# Patient Record
Sex: FEMALE | Race: WHITE | NOT HISPANIC OR LATINO | Employment: OTHER | URBAN - METROPOLITAN AREA
[De-identification: names, ages, dates, MRNs, and addresses within clinical notes are randomized per-mention and may not be internally consistent; named-entity substitution may affect disease eponyms.]

---

## 2017-09-28 ENCOUNTER — TRANSCRIBE ORDERS (OUTPATIENT)
Dept: ADMINISTRATIVE | Facility: HOSPITAL | Age: 82
End: 2017-09-28

## 2017-09-28 DIAGNOSIS — M54.5 LOW BACK PAIN, UNSPECIFIED BACK PAIN LATERALITY, UNSPECIFIED CHRONICITY, WITH SCIATICA PRESENCE UNSPECIFIED: Primary | ICD-10-CM

## 2017-10-11 ENCOUNTER — ANESTHESIA (OUTPATIENT)
Dept: PAIN MEDICINE | Facility: HOSPITAL | Age: 82
End: 2017-10-11

## 2017-10-11 ENCOUNTER — ANESTHESIA EVENT (OUTPATIENT)
Dept: PAIN MEDICINE | Facility: HOSPITAL | Age: 82
End: 2017-10-11

## 2017-10-11 ENCOUNTER — HOSPITAL ENCOUNTER (OUTPATIENT)
Dept: GENERAL RADIOLOGY | Facility: HOSPITAL | Age: 82
Discharge: HOME | End: 2017-10-11

## 2017-10-11 ENCOUNTER — HOSPITAL ENCOUNTER (OUTPATIENT)
Dept: PAIN MEDICINE | Facility: HOSPITAL | Age: 82
Discharge: HOME | End: 2017-10-11
Attending: ORTHOPAEDIC SURGERY | Admitting: ORTHOPAEDIC SURGERY

## 2017-10-11 VITALS
BODY MASS INDEX: 29.83 KG/M2 | DIASTOLIC BLOOD PRESSURE: 66 MMHG | TEMPERATURE: 98.4 F | SYSTOLIC BLOOD PRESSURE: 128 MMHG | HEART RATE: 84 BPM | WEIGHT: 158 LBS | HEIGHT: 61 IN | RESPIRATION RATE: 16 BRPM | OXYGEN SATURATION: 95 %

## 2017-10-11 DIAGNOSIS — R52 PAIN: ICD-10-CM

## 2017-10-11 DIAGNOSIS — M54.5 LOW BACK PAIN, UNSPECIFIED BACK PAIN LATERALITY, UNSPECIFIED CHRONICITY, WITH SCIATICA PRESENCE UNSPECIFIED: ICD-10-CM

## 2017-10-11 LAB — GLUCOSE BLDC GLUCOMTR-MCNC: 154 MG/DL (ref 70–130)

## 2017-10-11 PROCEDURE — 82962 GLUCOSE BLOOD TEST: CPT

## 2017-10-11 PROCEDURE — C1755 CATHETER, INTRASPINAL: HCPCS

## 2017-10-11 PROCEDURE — 77003 FLUOROGUIDE FOR SPINE INJECT: CPT

## 2017-10-11 PROCEDURE — 25010000002 METHYLPREDNISOLONE PER 80 MG: Performed by: ANESTHESIOLOGY

## 2017-10-11 PROCEDURE — 0 IOPAMIDOL 41 % SOLUTION: Performed by: ANESTHESIOLOGY

## 2017-10-11 RX ORDER — FESOTERODINE FUMARATE 4 MG/1
4 TABLET, EXTENDED RELEASE ORAL DAILY PRN
COMMUNITY

## 2017-10-11 RX ORDER — METHYLPREDNISOLONE ACETATE 80 MG/ML
80 INJECTION, SUSPENSION INTRA-ARTICULAR; INTRALESIONAL; INTRAMUSCULAR; SOFT TISSUE ONCE
Status: COMPLETED | OUTPATIENT
Start: 2017-10-11 | End: 2017-10-11

## 2017-10-11 RX ORDER — ESOMEPRAZOLE MAGNESIUM 40 MG/1
40 CAPSULE, DELAYED RELEASE ORAL
COMMUNITY
End: 2020-10-05

## 2017-10-11 RX ORDER — AMLODIPINE BESYLATE 5 MG/1
5 TABLET ORAL DAILY
COMMUNITY
End: 2020-10-05

## 2017-10-11 RX ORDER — LIDOCAINE HYDROCHLORIDE 10 MG/ML
1 INJECTION, SOLUTION INFILTRATION; PERINEURAL ONCE AS NEEDED
Status: DISCONTINUED | OUTPATIENT
Start: 2017-10-11 | End: 2017-10-12 | Stop reason: HOSPADM

## 2017-10-11 RX ORDER — MIDAZOLAM HYDROCHLORIDE 1 MG/ML
1 INJECTION INTRAMUSCULAR; INTRAVENOUS AS NEEDED
Status: DISCONTINUED | OUTPATIENT
Start: 2017-10-11 | End: 2017-10-12 | Stop reason: HOSPADM

## 2017-10-11 RX ORDER — LEVOTHYROXINE SODIUM 0.05 MG/1
50 TABLET ORAL
COMMUNITY

## 2017-10-11 RX ORDER — SODIUM CHLORIDE 0.9 % (FLUSH) 0.9 %
1-10 SYRINGE (ML) INJECTION AS NEEDED
Status: DISCONTINUED | OUTPATIENT
Start: 2017-10-11 | End: 2017-10-12 | Stop reason: HOSPADM

## 2017-10-11 RX ORDER — VALSARTAN AND HYDROCHLOROTHIAZIDE 160; 25 MG/1; MG/1
2 TABLET ORAL DAILY
COMMUNITY
End: 2020-09-21 | Stop reason: SDUPTHER

## 2017-10-11 RX ORDER — FENTANYL CITRATE 50 UG/ML
50 INJECTION, SOLUTION INTRAMUSCULAR; INTRAVENOUS AS NEEDED
Status: DISCONTINUED | OUTPATIENT
Start: 2017-10-11 | End: 2017-10-12 | Stop reason: HOSPADM

## 2017-10-11 RX ORDER — ATORVASTATIN CALCIUM 20 MG/1
20 TABLET, FILM COATED ORAL DAILY
COMMUNITY
End: 2020-11-11

## 2017-10-11 RX ADMIN — METHYLPREDNISOLONE ACETATE 80 MG: 80 INJECTION, SUSPENSION INTRA-ARTICULAR; INTRALESIONAL; INTRAMUSCULAR; SOFT TISSUE at 10:28

## 2017-10-11 RX ADMIN — IOPAMIDOL 10 ML: 408 INJECTION, SOLUTION INTRATHECAL at 10:27

## 2017-10-11 NOTE — ANESTHESIA PROCEDURE NOTES
PAIN Epidural block    Patient location during procedure: pain clinic  Start Time: 10/11/2017 10:20 AM  Stop Time: 10/11/2017 10:28 AM  Indication:procedure for pain  Performed By  Anesthesiologist: GORDY CORONA  Preanesthetic Checklist  Completed: patient identified, site marked, surgical consent, pre-op evaluation, timeout performed, IV checked, risks and benefits discussed and monitors and equipment checked  Additional Notes  Low back pain, lumbar radiculopathy  Fluoro used  Prep:  Pt Position:prone  Sterile Tech:cap, gloves, mask and sterile barrier  Prep:chlorhexidine gluconate and isopropyl alcohol  Monitoring:blood pressure monitoring, continuous pulse oximetry and EKG  Procedure:  Approach:midline  Guidance: fluoroscopy  Location:lumbar  Level:5-6  Needle Type:Tuohy  Needle Gauge:20  Aspiration:negative  Medications:  Depomedrol:80  Preservative Free Saline:3mL  Isovue:3mL  Comments:B/l spread  Post Assessment:  Dressing:occlusive dressing applied  Pt Tolerance:patient tolerated the procedure well with no apparent complications  Complications:no

## 2017-10-11 NOTE — H&P
[facility]    History and Physical    Patient Name: Ashli Eaton  :  1935  MRN:  [id number]  Date of Admission: 10/11/2017    Subjective     Patient is a 81 y.o. female presents with chief complaint of chronic, severe, waxing and waning low back and leg: right pain.  Onset of symptoms was abrupt starting 1 year ago.  Symptoms are associated/aggravated by activity or sitting. Symptoms improve with nothing.  First noticed pain 2017 while getting off a flight.  Pain has been present since then though no specific event/injury.  Pain fluctuates from a 4-8/10 w/ activity -- pain is terrible when doing stairs.  Presents for lesi 1.      The following portions of the patients history were reviewed and updated as appropriate: current medications, allergies, past medical history, past surgical history, past family history, past social history and problem list                Objective     Past Medical History:   Past Medical History:   Diagnosis Date   • Arthritis    • Diabetes mellitus    • GERD (gastroesophageal reflux disease)    • Hyperlipidemia    • Hypertension      Past Surgical History:   Past Surgical History:   Procedure Laterality Date   • APPENDECTOMY     • CATARACT EXTRACTION Bilateral    • CHOLECYSTECTOMY     • COLON SURGERY     • HAND SURGERY     • HYSTERECTOMY       Family History: History reviewed. No pertinent family history.  Social History:   Social History   Substance Use Topics   • Smoking status: Never Smoker   • Smokeless tobacco: Never Used   • Alcohol use Yes      Comment: social       Vital Signs Range for the last 24 hours  Temperature: Temp:  [36.9 °C (98.4 °F)] 36.9 °C (98.4 °F)   Temp Source: Temp src: Oral   BP:     Pulse: Heart Rate:  [98] 98   Respirations: Resp:  [16] 16   SPO2: SpO2:  [95 %] 95 %   O2 Amount (l/min):     O2 Devices O2 Device: room air   Weight: Weight:  [158 lb (71.7 kg)] 158 lb (71.7 kg)     Flowsheet Rows         First Filed Value    Admission  "Height  61\" (154.9 cm) Documented at 10/11/2017 1009    Admission Weight  158 lb (71.7 kg) Documented at 10/11/2017 1009          --------------------------------------------------------------------------------    No current outpatient prescriptions on file.     No current facility-administered medications for this encounter.        --------------------------------------------------------------------------------  Assessment/Plan   Plan:  1. Epidural with fluoroscopy +/- epidurogram (if needed)  2. Physical therapy exercises at home as prescribed by physical therapy or from the pain clinic handout (given to the patient). Continuation of these exercises every day, or multiple times per week, even when the patient has good pain relief, was stressed to the patient as a preventative measure to decrease the frequency and severity of future pain episodes.  3. Continue pain medicines as already prescribed. If patient not currently taking any, it is recommended to begin Acetaminophen 1000 mg po q 8 hours. If other medicines containing Acetaminophen are currently prescribed, maintain daily dose at 3000mg.   4. If they can tolerate NSAIDS, it is recommended to take Ibuprofen 600 mg po q 6 hours for 7 days during pain exacerbations. Alternatively, they may substitute an NSAID of their choice (e.g. Aleve)  5. Heat and ice to the affected area as tolerated for pain control. It was discussed that heating pads can cause burns.  6. Low impact exercise such as walking or water exercise was recommended to maintain overall health and aid in weight control.   7. Follow up as needed for subsequent injections.  8. Patient was counseled to abstain from tobacco products.     Anesthesia Evaluation     Patient summary reviewed and Nursing notes reviewed   no history of anesthetic complications:         Airway   Mallampati: II  TM distance: >3 FB  Dental - normal exam     Pulmonary - negative pulmonary ROS and normal exam   Cardiovascular - " normal exam    (+) hypertension, hyperlipidemia      Neuro/Psych- negative ROS and neuro exam normal  GI/Hepatic/Renal/Endo    (+)  GERD, diabetes mellitus,     Musculoskeletal (-) normal exam    Abdominal  - normal exam   Substance History - negative use     OB/GYN negative ob/gyn ROS         Other   (+) arthritis                                Diagnosis and Plan    Treatment Plan  ASA 2      Procedures: Lumbar Epidural Steroid Injection(LESI), With fluoroscopy,       Anesthetic plan and risks discussed with patient.          Diagnosis     * Low back pain [M54.5]     * Lumbar radiculopathy [M54.16]

## 2017-11-08 ENCOUNTER — TRANSCRIBE ORDERS (OUTPATIENT)
Dept: PAIN MEDICINE | Facility: HOSPITAL | Age: 82
End: 2017-11-08

## 2017-11-08 DIAGNOSIS — M54.5 LOW BACK PAIN, UNSPECIFIED BACK PAIN LATERALITY, UNSPECIFIED CHRONICITY, WITH SCIATICA PRESENCE UNSPECIFIED: Primary | ICD-10-CM

## 2017-11-30 ENCOUNTER — HOSPITAL ENCOUNTER (OUTPATIENT)
Dept: GENERAL RADIOLOGY | Facility: HOSPITAL | Age: 82
Discharge: HOME | End: 2017-11-30

## 2017-11-30 ENCOUNTER — HOSPITAL ENCOUNTER (OUTPATIENT)
Dept: PAIN MEDICINE | Facility: HOSPITAL | Age: 82
Discharge: HOME | End: 2017-11-30
Admitting: ORTHOPAEDIC SURGERY

## 2017-11-30 ENCOUNTER — ANESTHESIA (OUTPATIENT)
Dept: PAIN MEDICINE | Facility: HOSPITAL | Age: 82
End: 2017-11-30

## 2017-11-30 ENCOUNTER — ANESTHESIA EVENT (OUTPATIENT)
Dept: PAIN MEDICINE | Facility: HOSPITAL | Age: 82
End: 2017-11-30

## 2017-11-30 VITALS
OXYGEN SATURATION: 98 % | HEART RATE: 75 BPM | SYSTOLIC BLOOD PRESSURE: 161 MMHG | TEMPERATURE: 97.8 F | RESPIRATION RATE: 16 BRPM | DIASTOLIC BLOOD PRESSURE: 93 MMHG

## 2017-11-30 DIAGNOSIS — M54.5 LOW BACK PAIN, UNSPECIFIED BACK PAIN LATERALITY, UNSPECIFIED CHRONICITY, WITH SCIATICA PRESENCE UNSPECIFIED: ICD-10-CM

## 2017-11-30 DIAGNOSIS — R52 PAIN: ICD-10-CM

## 2017-11-30 PROCEDURE — 0 IOPAMIDOL 41 % SOLUTION: Performed by: ANESTHESIOLOGY

## 2017-11-30 PROCEDURE — 77003 FLUOROGUIDE FOR SPINE INJECT: CPT

## 2017-11-30 PROCEDURE — C1755 CATHETER, INTRASPINAL: HCPCS

## 2017-11-30 PROCEDURE — 25010000002 METHYLPREDNISOLONE PER 80 MG: Performed by: ANESTHESIOLOGY

## 2017-11-30 RX ORDER — METHYLPREDNISOLONE ACETATE 80 MG/ML
80 INJECTION, SUSPENSION INTRA-ARTICULAR; INTRALESIONAL; INTRAMUSCULAR; SOFT TISSUE ONCE
Status: COMPLETED | OUTPATIENT
Start: 2017-11-30 | End: 2017-11-30

## 2017-11-30 RX ORDER — FENTANYL CITRATE 50 UG/ML
50 INJECTION, SOLUTION INTRAMUSCULAR; INTRAVENOUS AS NEEDED
Status: DISCONTINUED | OUTPATIENT
Start: 2017-11-30 | End: 2017-12-01 | Stop reason: HOSPADM

## 2017-11-30 RX ORDER — MIDAZOLAM HYDROCHLORIDE 1 MG/ML
1 INJECTION INTRAMUSCULAR; INTRAVENOUS AS NEEDED
Status: DISCONTINUED | OUTPATIENT
Start: 2017-11-30 | End: 2017-12-01 | Stop reason: HOSPADM

## 2017-11-30 RX ORDER — SODIUM CHLORIDE 0.9 % (FLUSH) 0.9 %
1-10 SYRINGE (ML) INJECTION AS NEEDED
Status: DISCONTINUED | OUTPATIENT
Start: 2017-11-30 | End: 2017-12-01 | Stop reason: HOSPADM

## 2017-11-30 RX ORDER — LIDOCAINE HYDROCHLORIDE 10 MG/ML
1 INJECTION, SOLUTION INFILTRATION; PERINEURAL ONCE AS NEEDED
Status: DISCONTINUED | OUTPATIENT
Start: 2017-11-30 | End: 2017-12-01 | Stop reason: HOSPADM

## 2017-11-30 RX ADMIN — METHYLPREDNISOLONE ACETATE 80 MG: 80 INJECTION, SUSPENSION INTRA-ARTICULAR; INTRALESIONAL; INTRAMUSCULAR; SOFT TISSUE at 12:49

## 2017-11-30 RX ADMIN — IOPAMIDOL 1 ML: 408 INJECTION, SOLUTION INTRATHECAL at 12:49

## 2017-11-30 NOTE — ANESTHESIA PROCEDURE NOTES
PAIN Epidural block    Patient location during procedure: pain clinic  Start Time: 11/30/2017 12:43 PM  Stop Time: 11/30/2017 12:51 PM  Indication:at surgeon's request and procedure for pain  Performed By  Anesthesiologist: HASEEB BUNCH  Preanesthetic Checklist  Completed: patient identified, site marked, surgical consent, pre-op evaluation, timeout performed, IV checked, risks and benefits discussed and monitors and equipment checked  Additional Notes  Post-Op Diagnosis Codes:     * Lumbar radiculopathy (M54.16)  Prep:  Pt Position:prone  Sterile Tech:cap, gloves, mask and sterile barrier  Prep:chlorhexidine gluconate and isopropyl alcohol  Monitoring:blood pressure monitoring, continuous pulse oximetry and EKG  Procedure:  Sedation: no   Approach:left paramedian  Guidance: fluoroscopy  Location:lumbar  Needle Type:Proxlyeduard  Needle Gauge:17 G  Aspiration:negative  Test Dose:negative  Medications:  Depomedrol:80 mg    Post Assessment:  Dressing:occlusive dressing applied  Pt Tolerance:patient tolerated the procedure well with no apparent complications  Complications:no

## 2020-08-24 RX ORDER — INSULIN GLARGINE 100 [IU]/ML
INJECTION, SOLUTION SUBCUTANEOUS
Qty: 3 PEN | Refills: 0 | Status: SHIPPED | OUTPATIENT
Start: 2020-08-24 | End: 2020-10-02 | Stop reason: SDUPTHER

## 2020-09-14 PROBLEM — I10 BENIGN ESSENTIAL HYPERTENSION: Status: ACTIVE | Noted: 2020-09-14

## 2020-09-14 PROBLEM — E55.9 VITAMIN D DEFICIENCY: Status: ACTIVE | Noted: 2020-09-14

## 2020-09-14 PROBLEM — E11.9 TYPE 2 DIABETES MELLITUS (HCC): Status: ACTIVE | Noted: 2020-09-14

## 2020-09-14 PROBLEM — R51.9 CHRONIC NONINTRACTABLE HEADACHE: Status: ACTIVE | Noted: 2020-09-14

## 2020-09-14 PROBLEM — N18.30 CHRONIC KIDNEY DISEASE, STAGE III (MODERATE) (HCC): Status: ACTIVE | Noted: 2020-09-14

## 2020-09-14 PROBLEM — K21.9 GASTROESOPHAGEAL REFLUX DISEASE WITHOUT ESOPHAGITIS: Status: ACTIVE | Noted: 2020-09-14

## 2020-09-14 PROBLEM — E78.2 MIXED HYPERLIPIDEMIA: Status: ACTIVE | Noted: 2020-09-14

## 2020-09-14 PROBLEM — N32.81 OVERACTIVE BLADDER: Status: ACTIVE | Noted: 2020-09-14

## 2020-09-14 PROBLEM — Z90.49 STATUS POST LAPAROSCOPIC CHOLECYSTECTOMY: Status: ACTIVE | Noted: 2020-09-14

## 2020-09-14 PROBLEM — E03.4 HYPOTHYROIDISM DUE TO ACQUIRED ATROPHY OF THYROID: Status: ACTIVE | Noted: 2020-09-14

## 2020-09-14 PROBLEM — Z90.49 STATUS POST APPENDECTOMY: Status: ACTIVE | Noted: 2020-09-14

## 2020-09-14 PROBLEM — G89.29 CHRONIC NONINTRACTABLE HEADACHE: Status: ACTIVE | Noted: 2020-09-14

## 2020-09-14 PROBLEM — F41.1 GENERALIZED ANXIETY DISORDER: Status: ACTIVE | Noted: 2020-09-14

## 2020-09-14 PROBLEM — F41.8 ANXIETY ASSOCIATED WITH DEPRESSION: Status: ACTIVE | Noted: 2020-09-14

## 2020-09-14 PROBLEM — Z90.710 STATUS POST TOTAL HYSTERECTOMY: Status: ACTIVE | Noted: 2020-09-14

## 2020-09-14 PROBLEM — D80.3 IGG DEFICIENCY (HCC): Status: ACTIVE | Noted: 2020-09-14

## 2020-09-14 PROBLEM — Z90.49 STATUS POST COLECTOMY: Status: ACTIVE | Noted: 2020-09-14

## 2020-09-14 NOTE — PROGRESS NOTES
"Lydia Eaton is a 84 y.o. female.     No chief complaint on file.      History of Present Illness   This is an 84-year-old female with a past medical history significant for benign essential hypertension, diabetes mellitus type 2, hyperlipidemia, GERD, chronic kidney disease stage III, generalized anxiety with depression, urinary incontinence, IgG deficiency, status post appendectomy, status post partial hysterectomy with a subsequent oopherectomy in 1975, status post colectomy for diverticulitis in 1985, history of cervicogenic headaches, status post bilateral cataract surgery, status post laparoscopic cholecystectomy who presents today for a routine follow-up on labs and medications.  The patient is currently not experiencing any side effects or problems with her current medications.  The patient states she is having a lot of stress recently and a lot of this has been related to COVID.  She states that \"my nerves are shot\".  Glucose readings at home fasting have been averaging anywhere between 134 and 176.  The patient has been taking her medications without any notable side effects or problems.  She did have an ophthalmology exam in June.  She states that her reflux symptoms are currently well controlled with Pepcid taken only on an as-needed basis.  The Toviaz is also only taken on an as-needed basis and has worked well for an overactive bladder.  Patient reports that recently when she was seeing her orthopedic doctor that she had an episode where she felt lightheaded or dizzy, no symptoms of vertigo.  No chest pain, no shortness of air or heart palpitations.  No other new voiced complaints.    The following portions of the patient's history were reviewed and updated as appropriate: allergies, current medications, past family history, past medical history, past social history, past surgical history and problem list.    Depression Screen:  No flowsheet data found.    Assessment/Plan   Diagnoses " and all orders for this visit:    Benign essential hypertension    Mixed hyperlipidemia    Type 2 diabetes mellitus without complication, with long-term current use of insulin (CMS/[facility])    Hypothyroidism due to acquired atrophy of thyroid    Vitamin D deficiency    Gastroesophageal reflux disease without esophagitis    Overactive bladder    #1.  Benign essential hypertension-blood pressure is well controlled on exam, given her recent history of dizziness and lightheadedness we will check orthostatics in office.  The patient had some mild orthostasis on examination in the office, we will decrease her hydrochlorothiazide to 12.5 mg once daily and recheck a blood pressure in 2 weeks.  2.  Diabetes mellitus type 2-fasting blood sugar is 167 with an A1c of 6.8, patient is currently on metformin 500 mg twice daily, recommend we increase this to 500 mg in the morning and 2 tablets in the evening, continue Januvia 100 mg once daily and continue Lantus to 25 units nightly, will evaluate at follow-up.  Ophthalmology exam-June 2020 with Dr. Watts; monofilament completed today; microalbumin today.  3.  Hyperlipidemia-LDL goal is less than 70, total cholesterol 182, HDL 93, triglycerides 92 and LDL 73, currently on atorvastatin 20 mg once daily, recommend we continue current treatment.  4.  Chronic kidney disease stage III-BUN is 18 with a creatinine of 1.12 and an estimated glomerular filtration rate of 45, Would recommend the patient continue to hydrate well with water, avoid all nonsteroidal anti-inflammatory agents, any Grubbs 2 inhibitors, any proton pump inhibitors if possible as well as any nephrotoxic agents.  Would also recommend that the patient avoid excess salt and sodium in the diet, will continue to monitor the renal function on a regular basis.  #5.  Hypothyroidism-patient is currently on levothyroxine 50 mcg once daily, thyroid function studies were normal, recommend we continue current treatment.  6.  Anxiety  associated with depression-patient is currently on duloxetine 30 mg once daily, given her recent issues with anxiety we will increase this to duloxetine 60 mg once daily  7.  GERD-stable with Pepcid taken on an as-needed basis.  8.  Overweight-weight is 153 pounds with a BMI of 27.98, would recommend diet and exercise to obtain a BMI ideally less than 25.  #9.  Vitamin D deficiency-level is 33.6, recommend patient increase vitamin D3 supplement  10.  Overactive bladder-stable on Toviaz which is taken on an as-needed basis.    Orthostatics in office  Influenza vaccination today.  Microalbumin today.    Follow-up in 3 months on depressionAnswers for HPI/ROS submitted by the patient on 9/14/2020   Diabetes problem  What is the primary reason for your visit?: Diabetes  Answers for HPI/ROS submitted by the patient on 9/14/2020   Diabetes problem  What is the primary reason for your visit?: Diabetes   with lab-CMP, A1c         Past Medical History:   Diagnosis Date   • Arthritis    • Diabetes mellitus (CMS/[facility])    • Encounter for Medicare annual wellness exam    • GERD (gastroesophageal reflux disease)    • Hyperlipidemia    • Hypertension    • Non-smoker     Never a smoker       Past Surgical History:   Procedure Laterality Date   • APPENDECTOMY     • CATARACT EXTRACTION Bilateral    • CHOLECYSTECTOMY     • COLECTOMY PARTIAL / TOTAL  11/1985   • COLONOSCOPY  12/07/2004    Dr. Hickey   • ENDOSCOPY      06/01 [place], 10/2004-bx H-pylori, 9/2009-gastric polyp(multi) [place]   • HAND SURGERY  06/1992    Thumb surgery   • HYSTERECTOMY  05/1974    partial abdominal hysterectomy-   • KNEE SURGERY  06/1981   • OOPHORECTOMY  05/1975       Family History   Problem Relation Age of Onset   • Cancer Mother         Bladder cancer   • Prostate cancer Father    • Suicidality Father         suicide completion    • Coronary artery disease Brother    • Hypertension Brother    • Cancer Other        Social History     Socioeconomic  History   • Marital status: Single     Spouse name: Not on file   • Number of children: Not on file   • Years of education: Not on file   • Highest education level: Not on file   Tobacco Use   • Smoking status: Never Smoker   • Smokeless tobacco: Never Used   • Tobacco comment: caffeine use   Substance and Sexual Activity   • Alcohol use: Yes     Comment: social   • Drug use: No   • Sexual activity: Defer       Current Outpatient Medications   Medication Sig Dispense Refill   • amLODIPine (NORVASC) 5 MG tablet Take 5 mg by mouth Daily.     • atorvastatin (LIPITOR) 20 MG tablet Take 20 mg by mouth Daily.     • esomeprazole (nexIUM) 40 MG capsule Take 40 mg by mouth Every Morning Before Breakfast.     • fesoterodine fumarate (TOVIAZ ER) 4 MG tablet sustained-release 24 hour capsule Take  by mouth Daily.     • LANTUS SOLOSTAR 100 UNIT/ML injection pen INJECT 25 UNITS UNDER THE SKIN EVERY NIGHT AT BEDTIME 3 pen 0   • levothyroxine (SYNTHROID, LEVOTHROID) 50 MCG tablet Take 50 mcg by mouth Daily.     • metFORMIN (GLUCOPHAGE) 500 MG tablet Take 500 mg by mouth 2 (Two) Times a Day With Meals.     • SITagliptin (JANUVIA) 100 MG tablet Take 100 mg by mouth Daily.     • valsartan-hydrochlorothiazide (DIOVAN-HCT) 160-25 MG per tablet Take 2 tablets by mouth Daily.       No current facility-administered medications for this visit.        Review of Systems    Objective   There were no vitals taken for this visit.    Physical Exam  Constitutional:  The patient appears well-developed and well-nourished and is in no acute distress.  Head and Face:  Head and face are symmetric, normocephalic, and atraumatic.  Ears, Nose, Mouth and Throat:  Lips, teeth, and gums appear healthy with good dentition.  The oropharynx is normal with no erythema, edema, exudate or lesions.  Neck:  The neck was normal in appearance and supple.  The trachea was midline.  Exam of the thyroid reveals no thyromegaly or masses.  Pulmonary:  Respiratory effort is  normal.  Lungs are clear to auscultation bilaterally.  Cardiovascular:  The heart rate was normal.  The rhythm was regular.  Heart sounds show a normal S1, a normal S2, no gallops, rubs or murmurs.  Pedal pulses are +2/4 bilaterally.  Examination of the extremities shows no edema.  FOOT EXAM:  PT/DP +2/4 bilaterally; no swelling, erythema or tenderness; good capillary refill; normal sensory-vibration sense with normal monofilament.  Lymphatic:  There is no palpable lymphadenopathy of the neck.  Musculoskeletal:  Gait and station are within normal limits.  Skin:  Skin and subcutaneous tissues are normal without rashes or lesions.  Psychiatric:  Patient's judgment and insight are unimpaired.  Patient is oriented to person, place and time.  Patient's mood and affect are normal.      No results found for this or any previous visit (from the past 2016 hour(s)).

## 2020-09-16 DIAGNOSIS — E78.2 MIXED HYPERLIPIDEMIA: ICD-10-CM

## 2020-09-16 DIAGNOSIS — I10 BENIGN ESSENTIAL HYPERTENSION: Primary | ICD-10-CM

## 2020-09-16 DIAGNOSIS — Z79.4 TYPE 2 DIABETES MELLITUS WITHOUT COMPLICATION, WITH LONG-TERM CURRENT USE OF INSULIN (HCC): ICD-10-CM

## 2020-09-16 DIAGNOSIS — E55.9 VITAMIN D DEFICIENCY: ICD-10-CM

## 2020-09-16 DIAGNOSIS — E03.4 HYPOTHYROIDISM DUE TO ACQUIRED ATROPHY OF THYROID: ICD-10-CM

## 2020-09-16 DIAGNOSIS — E11.9 TYPE 2 DIABETES MELLITUS WITHOUT COMPLICATION, WITH LONG-TERM CURRENT USE OF INSULIN (HCC): ICD-10-CM

## 2020-09-17 LAB
25(OH)D3+25(OH)D2 SERPL-MCNC: 33.6 NG/ML (ref 30–100)
ALBUMIN SERPL-MCNC: 4.2 G/DL (ref 3.6–4.6)
ALBUMIN/GLOB SERPL: 1.8 {RATIO} (ref 1.2–2.2)
ALP SERPL-CCNC: 62 IU/L (ref 39–117)
ALT SERPL-CCNC: 11 IU/L (ref 0–32)
AST SERPL-CCNC: 15 IU/L (ref 0–40)
BASOPHILS # BLD AUTO: 0.1 X10E3/UL (ref 0–0.2)
BASOPHILS NFR BLD AUTO: 1 %
BILIRUB SERPL-MCNC: 0.5 MG/DL (ref 0–1.2)
BUN SERPL-MCNC: 18 MG/DL (ref 8–27)
BUN/CREAT SERPL: 16 (ref 12–28)
CALCIUM SERPL-MCNC: 10.2 MG/DL (ref 8.7–10.3)
CHLORIDE SERPL-SCNC: 101 MMOL/L (ref 96–106)
CHOLEST SERPL-MCNC: 182 MG/DL (ref 100–199)
CO2 SERPL-SCNC: 24 MMOL/L (ref 20–29)
CREAT SERPL-MCNC: 1.12 MG/DL (ref 0.57–1)
EOSINOPHIL # BLD AUTO: 0.3 X10E3/UL (ref 0–0.4)
EOSINOPHIL NFR BLD AUTO: 4 %
ERYTHROCYTE [DISTWIDTH] IN BLOOD BY AUTOMATED COUNT: 14.1 % (ref 11.7–15.4)
GLOBULIN SER CALC-MCNC: 2.3 G/DL (ref 1.5–4.5)
GLUCOSE SERPL-MCNC: 167 MG/DL (ref 65–99)
HBA1C MFR BLD: 6.8 % (ref 4.8–5.6)
HCT VFR BLD AUTO: 46.6 % (ref 34–46.6)
HDLC SERPL-MCNC: 93 MG/DL
HGB BLD-MCNC: 15.7 G/DL (ref 11.1–15.9)
IMM GRANULOCYTES # BLD AUTO: 0 X10E3/UL (ref 0–0.1)
IMM GRANULOCYTES NFR BLD AUTO: 0 %
LDLC SERPL CALC-MCNC: 73 MG/DL (ref 0–99)
LYMPHOCYTES # BLD AUTO: 1.7 X10E3/UL (ref 0.7–3.1)
LYMPHOCYTES NFR BLD AUTO: 24 %
MCH RBC QN AUTO: 30.4 PG (ref 26.6–33)
MCHC RBC AUTO-ENTMCNC: 33.7 G/DL (ref 31.5–35.7)
MCV RBC AUTO: 90 FL (ref 79–97)
MONOCYTES # BLD AUTO: 0.5 X10E3/UL (ref 0.1–0.9)
MONOCYTES NFR BLD AUTO: 7 %
NEUTROPHILS # BLD AUTO: 4.6 X10E3/UL (ref 1.4–7)
NEUTROPHILS NFR BLD AUTO: 64 %
PLATELET # BLD AUTO: 238 X10E3/UL (ref 150–450)
POTASSIUM SERPL-SCNC: 4.4 MMOL/L (ref 3.5–5.2)
PROT SERPL-MCNC: 6.5 G/DL (ref 6–8.5)
RBC # BLD AUTO: 5.16 X10E6/UL (ref 3.77–5.28)
SODIUM SERPL-SCNC: 141 MMOL/L (ref 134–144)
T4 SERPL-MCNC: 7.9 UG/DL (ref 4.5–12)
TRIGL SERPL-MCNC: 92 MG/DL (ref 0–149)
TSH SERPL DL<=0.005 MIU/L-ACNC: 3.05 UIU/ML (ref 0.45–4.5)
VIT B12 SERPL-MCNC: 317 PG/ML (ref 232–1245)
VLDLC SERPL CALC-MCNC: 16 MG/DL (ref 5–40)
WBC # BLD AUTO: 7.2 X10E3/UL (ref 3.4–10.8)

## 2020-09-18 NOTE — PATIENT INSTRUCTIONS
-Diet, low carbohydrate, high-protein (diets that are low in carbohydrates and high in protein are very popular for weight loss)  -Exercise 30 to 45 minutes, 4-5 times per week  -Giving encouragement to exercise (we encourage all of her patients to exercise regularly 30 minutes of exercise 5 or more days per week)  -Special dietary instruction-we recommend you modify your diet to achieve and maintain a healthy weight.  Being overweight may increase your risk for developing health problems such as diabetes, heart disease and cancer.  Avoid high fat foods and eat a balanced diet rich in fruits and vegetables.  The combination of a reduced calorie diet and increased physical activity is recommended.    Diabetes handout:    Recommended fasting blood glucose     Recommend glucose 2 hours after meals less than 180    Symptoms of low blood glucose:  Confusion Dizziness feeling shaky Hunger Headache   Irritability Sweating Trembling Weakness Anxiety  Pale skin pounding heart    If your blood glucose is low, follow the steps below:  Follow the 15-15 rule: Eat or drink something from the list below equal to 15 g of carbohydrates  Rest for 15 minutes, then recheck your blood glucose.  If it is still low, (below 70), repeat step 1 above.  If your next meal is more than 1 hour away, you will need to eat 1 carbohydrate choice as a snack to keep your blood glucose from going low again.  If you cannot figure out why you have low blood glucose, call the office or go to the emergency room, as your medication may need to be adjusted.  Always carry something with you to treat an insulin reaction.  Use food from the list below.    Foods equal to 1 carbohydrate choice (15 g of carbohydrates):  3 glucose tablets or 4 dextrose tablets  4 ounces of fruit juice  5 to 6 ounces (approximately one half can) of regular soda such as a Coke or Pepsi  7-8 gummy or regular lifesavers  1 tablespoon of sugar or jelly.    If you have type 1  diabetes and you do not take care of low blood glucose, you may pass out.  If you do, a drug called glucagon should be injected into your skin, like you do with insulin.  This can be done by a family member or a friend who has been taught how to do it.  Since glucagon may cause you to vomit, you should be placed on your side when the injection is given.  If no one knows how to give the injection, you should be taken to the hospital.    You should awaken 10 minutes after the glucagon is injected.  If you do not, you should be taken at once to the hospital.    Cardiovascular risk in diabetic patients.    The presence of diabetes is associated with increased cardiovascular risk, particularly among women.  Patients with diabetes have a 2-4 times increased risk for cardiovascular disease, with more than two thirds of patients with diabetes eventually dying of heart disease.  The risk for stroke is 1.8-1.6 fold in diabetics.  Patients with diabetes are more likely to have undiagnosed coronary artery disease and have worse outcomes when hospitalized for other cardiovascular conditions, such as heart failure.    Hemoglobin A1c    Your blood sugar levels vary from minute to minute an hour to hour.  That is why you must test your blood sugar at home at certain times of the day.  You may test before and after meals, before and after exercise and whenever there are interruptions to your usual daily routine (travel, illness, stress, etc.).  However, you cannot test your blood sugar all of the time!  So, how can you know what your overall level of control is?    The hemoglobin A1c (HbA1C) test is like a batting average.  For example, sharon Diaz was able to hit home runs sometimes and strike out at others.  However, his overall batting average showed that he was a great hitter most of the time!  Your hemoglobin A1c let you know how well you have controlled your blood sugar on the average for the last 3 months.  The chart below  compares the results of a hemoglobin A1c to an average daily blood sugar range.    Hemoglobin A1c      average blood sugar range for the last 3 months.  4.0 to 6.0%        mg/deciliter-normal  6.1 to 7.0%       120-150 mg/deciliter  7.1 to 8.0%       151-180 mg/deciliter  8.1 to 9.0%       181-210 mg/deciliter  9.1 to 10%       211-240 mg/deciliter  10.1 to 11%       241-270 mg/deciliter  11.1 to 12%       271-300 mg/deciliter  12.1% to 13%       301-330 mg/deciliter  13.1% to 14%       331-360 mg/deciliter  Greater than 14%      greater than 360 mg/deciliter    How does hemoglobin A1c work?  The hemoglobin A1c measures the amount of sugar that attaches to protein in your red blood cells.  Your red blood cells live for ~2 to 3 months, so this test shows your average blood sugar levels during this time.  The greater the amount of sugar in your blood and the longer it is high, the more sugar that will attached to those red blood cells.

## 2020-09-20 ENCOUNTER — RESULTS ENCOUNTER (OUTPATIENT)
Dept: INTERNAL MEDICINE | Facility: CLINIC | Age: 85
End: 2020-09-20

## 2020-09-20 DIAGNOSIS — E78.2 MIXED HYPERLIPIDEMIA: ICD-10-CM

## 2020-09-20 DIAGNOSIS — Z79.4 TYPE 2 DIABETES MELLITUS WITHOUT COMPLICATION, WITH LONG-TERM CURRENT USE OF INSULIN (HCC): ICD-10-CM

## 2020-09-20 DIAGNOSIS — E03.4 HYPOTHYROIDISM DUE TO ACQUIRED ATROPHY OF THYROID: ICD-10-CM

## 2020-09-20 DIAGNOSIS — E55.9 VITAMIN D DEFICIENCY: ICD-10-CM

## 2020-09-20 DIAGNOSIS — I10 BENIGN ESSENTIAL HYPERTENSION: ICD-10-CM

## 2020-09-20 DIAGNOSIS — E11.9 TYPE 2 DIABETES MELLITUS WITHOUT COMPLICATION, WITH LONG-TERM CURRENT USE OF INSULIN (HCC): ICD-10-CM

## 2020-09-21 ENCOUNTER — OFFICE VISIT (OUTPATIENT)
Dept: INTERNAL MEDICINE | Facility: CLINIC | Age: 85
End: 2020-09-21

## 2020-09-21 VITALS
DIASTOLIC BLOOD PRESSURE: 70 MMHG | HEIGHT: 62 IN | SYSTOLIC BLOOD PRESSURE: 122 MMHG | BODY MASS INDEX: 28.16 KG/M2 | WEIGHT: 153 LBS | OXYGEN SATURATION: 98 % | HEART RATE: 102 BPM

## 2020-09-21 DIAGNOSIS — E55.9 VITAMIN D DEFICIENCY: ICD-10-CM

## 2020-09-21 DIAGNOSIS — I10 BENIGN ESSENTIAL HYPERTENSION: Primary | ICD-10-CM

## 2020-09-21 DIAGNOSIS — F41.1 GENERALIZED ANXIETY DISORDER: ICD-10-CM

## 2020-09-21 DIAGNOSIS — N32.81 OVERACTIVE BLADDER: ICD-10-CM

## 2020-09-21 DIAGNOSIS — N18.30 CHRONIC KIDNEY DISEASE, STAGE III (MODERATE) (HCC): ICD-10-CM

## 2020-09-21 DIAGNOSIS — E03.4 HYPOTHYROIDISM DUE TO ACQUIRED ATROPHY OF THYROID: ICD-10-CM

## 2020-09-21 DIAGNOSIS — Z79.4 TYPE 2 DIABETES MELLITUS WITHOUT COMPLICATION, WITH LONG-TERM CURRENT USE OF INSULIN (HCC): ICD-10-CM

## 2020-09-21 DIAGNOSIS — F41.8 ANXIETY ASSOCIATED WITH DEPRESSION: ICD-10-CM

## 2020-09-21 DIAGNOSIS — E11.9 TYPE 2 DIABETES MELLITUS WITHOUT COMPLICATION, WITH LONG-TERM CURRENT USE OF INSULIN (HCC): ICD-10-CM

## 2020-09-21 DIAGNOSIS — K21.9 GASTROESOPHAGEAL REFLUX DISEASE WITHOUT ESOPHAGITIS: ICD-10-CM

## 2020-09-21 DIAGNOSIS — E78.2 MIXED HYPERLIPIDEMIA: ICD-10-CM

## 2020-09-21 PROCEDURE — 99214 OFFICE O/P EST MOD 30 MIN: CPT | Performed by: INTERNAL MEDICINE

## 2020-09-21 RX ORDER — HYDROCHLOROTHIAZIDE 25 MG/1
25 TABLET ORAL DAILY
COMMUNITY
End: 2020-09-21 | Stop reason: SDUPTHER

## 2020-09-21 RX ORDER — IRBESARTAN 300 MG/1
300 TABLET ORAL EVERY MORNING
COMMUNITY
Start: 2020-07-29

## 2020-09-21 RX ORDER — DULOXETIN HYDROCHLORIDE 30 MG/1
60 CAPSULE, DELAYED RELEASE ORAL DAILY
Qty: 90 CAPSULE | Refills: 0 | Status: SHIPPED | OUTPATIENT
Start: 2020-09-21 | End: 2021-01-15

## 2020-09-21 RX ORDER — DULOXETIN HYDROCHLORIDE 30 MG/1
CAPSULE, DELAYED RELEASE ORAL
COMMUNITY
Start: 2020-07-02 | End: 2020-09-21 | Stop reason: SDUPTHER

## 2020-09-21 RX ORDER — HYDROCHLOROTHIAZIDE 25 MG/1
12.5 TABLET ORAL DAILY
Qty: 30 TABLET | Refills: 0 | Status: SHIPPED | OUTPATIENT
Start: 2020-09-21 | End: 2020-10-05

## 2020-10-02 RX ORDER — INSULIN GLARGINE 100 [IU]/ML
25 INJECTION, SOLUTION SUBCUTANEOUS NIGHTLY
Qty: 5 PEN | Refills: 0 | Status: SHIPPED | OUTPATIENT
Start: 2020-10-02 | End: 2020-12-03

## 2020-10-05 ENCOUNTER — CLINICAL SUPPORT (OUTPATIENT)
Dept: INTERNAL MEDICINE | Facility: CLINIC | Age: 85
End: 2020-10-05

## 2020-10-05 VITALS
RESPIRATION RATE: 16 BRPM | HEART RATE: 85 BPM | DIASTOLIC BLOOD PRESSURE: 62 MMHG | OXYGEN SATURATION: 98 % | BODY MASS INDEX: 28.16 KG/M2 | SYSTOLIC BLOOD PRESSURE: 108 MMHG | WEIGHT: 153 LBS | HEIGHT: 62 IN

## 2020-10-05 DIAGNOSIS — I95.1 ORTHOSTASIS: ICD-10-CM

## 2020-10-05 DIAGNOSIS — I10 BENIGN ESSENTIAL HYPERTENSION: Primary | ICD-10-CM

## 2020-10-05 PROCEDURE — 99213 OFFICE O/P EST LOW 20 MIN: CPT | Performed by: INTERNAL MEDICINE

## 2020-10-05 NOTE — PROGRESS NOTES
Subjective   Ashli Eaton is a 84 y.o. female.     Chief Complaint   Patient presents with   • Blood Pressure Check       History of Present Illness   This is an 84-year-old female with a past medical history significant for benign essential hypertension, diabetes mellitus type 2, hyperlipidemia, GERD, chronic kidney disease stage III, generalized anxiety with depression, urinary incontinence, IgG deficiency, status post appendectomy, status post partial hysterectomy with a subsequent oopherectomy in 1975, status post colectomy for diverticulitis in 1985, history of cervicogenic headaches, status post bilateral cataract surgery, status post laparoscopic cholecystectomy who presents today for a routine follow-up on labs and medications.  The patient is currently not experiencing any side effects or problems with her current medications.   The patient was previously having episodes of feeling lightheaded or dizzy, her hydrochlorothiazide was decreased to half a dose.  The patient feels somewhat better but is still having some persistent episodes where she feels lightheaded or dizzy.  No other new voiced complaints.    The following portions of the patient's history were reviewed and updated as appropriate: allergies, current medications, past family history, past medical history, past social history, past surgical history and problem list.    Depression Screen:  No flowsheet data found.    Assessment/Plan   There are no diagnoses linked to this encounter.     #1.  Hypertension-with a history of orthostasis-patient is currently on irbesartan 300 mg once a day as well as hydrochlorothiazide, will discontinue the hydrochlorothiazide and reevaluate a blood pressure in 2 weeks.    Blood pressure check in 2 weeks    Past Medical History:   Diagnosis Date   • Arthritis    • Diabetes mellitus (CMS/[facility])    • Encounter for Medicare annual wellness exam    • GERD (gastroesophageal reflux disease)    • Hyperlipidemia    •  Hypertension    • Non-smoker     Never a smoker       Past Surgical History:   Procedure Laterality Date   • APPENDECTOMY     • CATARACT EXTRACTION Bilateral    • CHOLECYSTECTOMY     • COLECTOMY PARTIAL / TOTAL  11/1985   • COLONOSCOPY  12/07/2004    Dr. Hickey   • ENDOSCOPY      06/01 [place], 10/2004-bx H-pylori, 9/2009-gastric polyp(multi) Carlosshawnamilind   • HAND SURGERY  06/1992    Thumb surgery   • HYSTERECTOMY  05/1974    partial abdominal hysterectomy-   • KNEE SURGERY  06/1981   • OOPHORECTOMY  05/1975       Family History   Problem Relation Age of Onset   • Cancer Mother         Bladder cancer   • Prostate cancer Father    • Suicidality Father         suicide completion    • Coronary artery disease Brother    • Hypertension Brother    • Cancer Other        Social History     Socioeconomic History   • Marital status: Single     Spouse name: Not on file   • Number of children: Not on file   • Years of education: Not on file   • Highest education level: Not on file   Tobacco Use   • Smoking status: Never Smoker   • Smokeless tobacco: Never Used   • Tobacco comment: caffeine use   Substance and Sexual Activity   • Alcohol use: Yes     Comment: social   • Drug use: No   • Sexual activity: Defer       Current Outpatient Medications   Medication Sig Dispense Refill   • fesoterodine fumarate (TOVIAZ ER) 4 MG tablet sustained-release 24 hour capsule Take  by mouth Daily.     • hydroCHLOROthiazide (HYDRODIURIL) 25 MG tablet Take 0.5 tablets by mouth Daily. 30 tablet 0   • Insulin Glargine (Lantus SoloStar) 100 UNIT/ML injection pen Inject 25 Units under the skin into the appropriate area as directed Every Night. 5 pen 0   • irbesartan (AVAPRO) 300 MG tablet      • levothyroxine (SYNTHROID, LEVOTHROID) 50 MCG tablet Take 50 mcg by mouth Daily.     • metFORMIN (GLUCOPHAGE) 500 MG tablet Take  by mouth.     • SITagliptin (JANUVIA) 100 MG tablet Take 100 mg by mouth Daily.     • amLODIPine (NORVASC) 5 MG tablet Take 5 mg  "by mouth Daily.     • atorvastatin (LIPITOR) 20 MG tablet Take 20 mg by mouth Daily.     • DULoxetine (CYMBALTA) 30 MG capsule Take 2 capsules by mouth Daily. 90 capsule 0     No current facility-administered medications for this visit.        Review of Systems    Objective   /62 (BP Location: Left arm, Patient Position: Sitting, Cuff Size: Adult)   Pulse 85   Resp 16   Ht 157.5 cm (62.01\")   Wt 69.4 kg (153 lb)   SpO2 98%   BMI 27.98 kg/m²     Physical Exam  Constitutional:  Patient appears well-developed, well-nourished and in no acute distress.  Neck:  The neck is supple and symmetric.  The trachea is midline with no masses.  Exam of the thyroid reveals no thyromegaly or masses.  Pulmonary:  Respiratory effort is normal with no increased work of breathing or respiratory distress.  Lungs are clear to auscultation bilaterally.  Cardiovascular:  Auscultation of the heart rate and rhythm is normal.  S1 and S2 are normal without rubs, gallops or murmurs.  Carotid pulses are 2+ bilaterally without bruit.  Examination of the extremities reveals no edema.  Neurologic:  Cranial nerves II-XII are grossly intact.  Psychiatric:  Patient's judgment and insight are unimpaired.  Patient's mood and affect are normal.      Recent Results (from the past 2016 hour(s))   CBC & Differential    Collection Time: 09/16/20  9:04 AM    Specimen: Blood   Result Value Ref Range    WBC 7.2 3.4 - 10.8 x10E3/uL    RBC 5.16 3.77 - 5.28 x10E6/uL    Hemoglobin 15.7 11.1 - 15.9 g/dL    Hematocrit 46.6 34.0 - 46.6 %    MCV 90 79 - 97 fL    MCH 30.4 26.6 - 33.0 pg    MCHC 33.7 31.5 - 35.7 g/dL    RDW 14.1 11.7 - 15.4 %    Platelets 238 150 - 450 x10E3/uL    Neutrophil Rel % 64 Not Estab. %    Lymphocyte Rel % 24 Not Estab. %    Monocyte Rel % 7 Not Estab. %    Eosinophil Rel % 4 Not Estab. %    Basophil Rel % 1 Not Estab. %    Neutrophils Absolute 4.6 1.4 - 7.0 x10E3/uL    Lymphocytes Absolute 1.7 0.7 - 3.1 x10E3/uL    Monocytes Absolute " 0.5 0.1 - 0.9 x10E3/uL    Eosinophils Absolute 0.3 0.0 - 0.4 x10E3/uL    Basophils Absolute 0.1 0.0 - 0.2 x10E3/uL    Immature Granulocyte Rel % 0 Not Estab. %    Immature Grans Absolute 0.0 0.0 - 0.1 x10E3/uL   Comprehensive Metabolic Panel    Collection Time: 09/16/20  9:04 AM    Specimen: Blood   Result Value Ref Range    Glucose 167 (H) 65 - 99 mg/dL    BUN 18 8 - 27 mg/dL    Creatinine 1.12 (H) 0.57 - 1.00 mg/dL    eGFR Non African Am 45 (L) >59 mL/min/1.73    eGFR African Am 52 (L) >59 mL/min/1.73    BUN/Creatinine Ratio 16 12 - 28    Sodium 141 134 - 144 mmol/L    Potassium 4.4 3.5 - 5.2 mmol/L    Chloride 101 96 - 106 mmol/L    Total CO2 24 20 - 29 mmol/L    Calcium 10.2 8.7 - 10.3 mg/dL    Total Protein 6.5 6.0 - 8.5 g/dL    Albumin 4.2 3.6 - 4.6 g/dL    Globulin 2.3 1.5 - 4.5 g/dL    A/G Ratio 1.8 1.2 - 2.2    Total Bilirubin 0.5 0.0 - 1.2 mg/dL    Alkaline Phosphatase 62 39 - 117 IU/L    AST (SGOT) 15 0 - 40 IU/L    ALT (SGPT) 11 0 - 32 IU/L   Lipid Panel    Collection Time: 09/16/20  9:04 AM    Specimen: Blood   Result Value Ref Range    Total Cholesterol 182 100 - 199 mg/dL    Triglycerides 92 0 - 149 mg/dL    HDL Cholesterol 93 >39 mg/dL    VLDL Cholesterol Cosmo 16 5 - 40 mg/dL    LDL Chol Calc (NIH) 73 0 - 99 mg/dL   Vitamin D 25 Hydroxy    Collection Time: 09/16/20  9:04 AM    Specimen: Blood   Result Value Ref Range    25 Hydroxy, Vitamin D 33.6 30.0 - 100.0 ng/mL   Hemoglobin A1c    Collection Time: 09/16/20  9:04 AM    Specimen: Blood   Result Value Ref Range    Hemoglobin A1C 6.8 (H) 4.8 - 5.6 %   TSH Rfx On Abnormal To Free T4    Collection Time: 09/16/20  9:04 AM    Specimen: Blood   Result Value Ref Range    TSH 3.050 0.450 - 4.500 uIU/mL   Vitamin B12    Collection Time: 09/16/20  9:04 AM    Specimen: Blood   Result Value Ref Range    Vitamin B-12 317 232 - 1,245 pg/mL   T4    Collection Time: 09/16/20  9:04 AM    Specimen: Blood   Result Value Ref Range    T4, Total 7.9 4.5 - 12.0 ug/dL

## 2020-10-19 PROBLEM — R13.19 ESOPHAGEAL DYSPHAGIA: Status: ACTIVE | Noted: 2020-10-19

## 2020-11-11 ENCOUNTER — OFFICE VISIT (OUTPATIENT)
Dept: GASTROENTEROLOGY | Facility: CLINIC | Age: 85
End: 2020-11-11

## 2020-11-11 VITALS
WEIGHT: 160 LBS | DIASTOLIC BLOOD PRESSURE: 80 MMHG | HEIGHT: 62 IN | SYSTOLIC BLOOD PRESSURE: 130 MMHG | TEMPERATURE: 96.9 F | OXYGEN SATURATION: 99 % | BODY MASS INDEX: 29.44 KG/M2 | HEART RATE: 85 BPM | RESPIRATION RATE: 16 BRPM

## 2020-11-11 DIAGNOSIS — K21.9 GASTROESOPHAGEAL REFLUX DISEASE WITHOUT ESOPHAGITIS: ICD-10-CM

## 2020-11-11 DIAGNOSIS — R13.19 ESOPHAGEAL DYSPHAGIA: Primary | ICD-10-CM

## 2020-11-11 PROCEDURE — 99204 OFFICE O/P NEW MOD 45 MIN: CPT | Performed by: INTERNAL MEDICINE

## 2020-11-11 RX ORDER — ROSUVASTATIN CALCIUM 40 MG/1
40 TABLET, COATED ORAL NIGHTLY
COMMUNITY

## 2020-11-11 NOTE — PATIENT INSTRUCTIONS
Continue pantoprazole for acid reflux.    Schedule xray to evaluate esophageal anatomy.     Further recommendations will be made pending the results of the above work up and clinical course.

## 2020-11-11 NOTE — PROGRESS NOTES
"Chest Pain (when eats) and Difficulty Swallowing      HPI  Patient presents today with complaints of trouble swallowing. She has had symptoms fore a couple of years. She reports previous esophageal dilation years ago.     If she eats Beef she has esophageal pain and difficulty with the passage. If she drinks hot liquids this can help with the passage. Symptoms typically happen with eating. She does not have pain or trouble when she is not eating.     No forced regurgitation.     She has \"bad\" acid reflux. She is currently on protonix and this is helping with reflux. She has been on pantoprazole for the past two weeks and feels that this has worked the best out of the other medications. Change in acid rx has not helped with trouble swallowing.     Previous treatments include Pepcid and Nexium plus omeprazole.     She has had 3 previous colonoscopies. Last one she thinks was 7-8 years ago. She has had previous resection for diverticulitis.    Review of Systems   Constitutional: Negative for appetite change, chills, diaphoresis, fatigue, fever and unexpected weight change.   HENT: Negative for dental problem, ear pain, mouth sores, rhinorrhea, sore throat and voice change.    Eyes: Negative for pain, redness and visual disturbance.   Respiratory: Negative for cough, chest tightness and wheezing.    Cardiovascular: Negative for chest pain, palpitations and leg swelling.   Endocrine: Negative for cold intolerance, heat intolerance, polydipsia, polyphagia and polyuria.   Genitourinary: Positive for frequency and urgency. Negative for dysuria and hematuria.   Musculoskeletal: Positive for arthralgias, back pain and neck pain. Negative for joint swelling and myalgias.   Skin: Negative for rash.   Allergic/Immunologic: Negative for environmental allergies, food allergies and immunocompromised state.   Neurological: Negative for dizziness, seizures, weakness, numbness and headaches.   Hematological: Bruises/bleeds easily. "   Psychiatric/Behavioral: Negative for sleep disturbance. The patient is not nervous/anxious.         I have reviewed and confirmed the accuracy of the HPI and ROS as documented by the APRN FAITH Olivarez     Problem List:    Patient Active Problem List   Diagnosis   • Benign essential hypertension   • Mixed hyperlipidemia   • Type 2 diabetes mellitus (CMS/HCC)   • Hypothyroidism due to acquired atrophy of thyroid   • Vitamin D deficiency   • Gastroesophageal reflux disease without esophagitis   • Overactive bladder   • Generalized anxiety disorder   • Anxiety associated with depression   • IgG deficiency (CMS/[facility])   • Chronic kidney disease, stage III (moderate)   • Status post appendectomy   • Status post total hysterectomy   • Status post colectomy   • Chronic nonintractable headache   • Status post laparoscopic cholecystectomy   • Orthostasis   • Esophageal dysphagia       Medical History:    Past Medical History:   Diagnosis Date   • Arthritis    • Diabetes mellitus (CMS/HCC)    • Encounter for Medicare annual wellness exam    • GERD (gastroesophageal reflux disease)    • Hyperlipidemia    • Hypertension    • Non-smoker     Never a smoker        Social History:    Social History     Socioeconomic History   • Marital status: Single     Spouse name: Not on file   • Number of children: Not on file   • Years of education: Not on file   • Highest education level: Not on file   Tobacco Use   • Smoking status: Never Smoker   • Smokeless tobacco: Never Used   • Tobacco comment: caffeine use   Substance and Sexual Activity   • Alcohol use: Yes     Comment: social   • Drug use: No   • Sexual activity: Defer       Family History:   Family History   Problem Relation Age of Onset   • Cancer Mother         Bladder cancer   • Prostate cancer Father    • Suicidality Father         suicide completion    • Coronary artery disease Brother    • Hypertension Brother    • Colon cancer Brother    • Cancer Other    • Colon  polyps Neg Hx        Surgical History:   Past Surgical History:   Procedure Laterality Date   • APPENDECTOMY     • CATARACT EXTRACTION Bilateral    • CHOLECYSTECTOMY     • COLECTOMY PARTIAL / TOTAL  11/1985   • COLONOSCOPY  12/07/2004    Dr. Hickey   • ENDOSCOPY      06/01 [place], 10/2004-bx H-pylori, 9/2009-gastric polyp(multi) Elio   • HAND SURGERY  06/1992    Thumb surgery   • HYSTERECTOMY  05/1974    partial abdominal hysterectomy-   • KNEE SURGERY  06/1981   • OOPHORECTOMY  05/1975         Current Outpatient Medications:   •  DULoxetine (CYMBALTA) 30 MG capsule, Take 2 capsules by mouth Daily., Disp: 90 capsule, Rfl: 0  •  fesoterodine fumarate (TOVIAZ ER) 4 MG tablet sustained-release 24 hour capsule, Take  by mouth Daily., Disp: , Rfl:   •  Insulin Glargine (Lantus SoloStar) 100 UNIT/ML injection pen, Inject 25 Units under the skin into the appropriate area as directed Every Night., Disp: 5 pen, Rfl: 0  •  irbesartan (AVAPRO) 300 MG tablet, , Disp: , Rfl:   •  levothyroxine (SYNTHROID, LEVOTHROID) 50 MCG tablet, Take 25 mcg by mouth Daily., Disp: , Rfl:   •  metFORMIN (GLUCOPHAGE) 500 MG tablet, Take  by mouth., Disp: , Rfl:   •  pantoprazole (PROTONIX) 40 MG EC tablet, Take 1 tablet by mouth Daily., Disp: 90 tablet, Rfl: 1  •  rosuvastatin (CRESTOR) 40 MG tablet, Take 40 mg by mouth Daily., Disp: , Rfl:   •  SITagliptin (JANUVIA) 100 MG tablet, Take 100 mg by mouth Daily., Disp: , Rfl:     Allergies:   Allergies   Allergen Reactions   • Sulfa Antibiotics Swelling   • Penicillins Rash        The following portions of the patient's history were reviewed and updated as appropriate: allergies, current medications, past family history, past medical history, past social history, past surgical history and problem list.    Vitals:    11/11/20 1506   BP: 130/80   Pulse: 85   Resp: 16   Temp: 96.9 °F (36.1 °C)   SpO2: 99%         11/11/20  1506   Weight: 72.6 kg (160 lb)     Body mass index is 29.26  kg/m².      PHYSICAL EXAM:  Physical Exam  Vitals signs reviewed.   Constitutional:       Appearance: Normal appearance.   HENT:      Nose: No nasal deformity.   Eyes:      General: No scleral icterus.  Neck:      Comments: Trachea midline.  Cardiovascular:      Rate and Rhythm: Normal rate and regular rhythm.   Pulmonary:      Effort: No respiratory distress.      Breath sounds: Normal breath sounds.   Abdominal:      General: Bowel sounds are normal. There is no distension.      Palpations: Abdomen is soft. There is no mass.      Tenderness: There is no abdominal tenderness.   Lymphadenopathy:      Comments: No periumbilical lymphadenopathy.   Skin:     General: Skin is warm.   Neurological:      Mental Status: She is alert.           Assessment/ Plan  Diagnoses and all orders for this visit:    1. Esophageal dysphagia (Primary)  -     FL Esophagram Complete Double-Contrast; Future    2. Gastroesophageal reflux disease without esophagitis  -     FL Esophagram Complete Double-Contrast; Future         No follow-ups on file.    Patient Instructions   Continue pantoprazole for acid reflux.    Schedule xray to evaluate esophageal anatomy.     Further recommendations will be made pending the results of the above work up and clinical course.           Discussion:  Discussed alternative causes for chest pain including esophageal spasms and motility testing. Will proceed with esophagram first. TO consider manometry and pH impedance based on findings. Also to consider EGD as discussed.    Documentation was completed by Ruth CUEVAS acting as a scribe in the following sections (HPI, Assessment, Plan) on behalf of the billing provider. Geno

## 2020-11-13 ENCOUNTER — TRANSCRIBE ORDERS (OUTPATIENT)
Dept: SLEEP MEDICINE | Facility: HOSPITAL | Age: 85
End: 2020-11-13

## 2020-11-13 DIAGNOSIS — Z01.818 OTHER SPECIFIED PRE-OPERATIVE EXAMINATION: Primary | ICD-10-CM

## 2020-11-16 ENCOUNTER — LAB (OUTPATIENT)
Dept: LAB | Facility: HOSPITAL | Age: 85
End: 2020-11-16

## 2020-11-16 DIAGNOSIS — Z01.818 OTHER SPECIFIED PRE-OPERATIVE EXAMINATION: ICD-10-CM

## 2020-11-16 PROCEDURE — U0004 COV-19 TEST NON-CDC HGH THRU: HCPCS

## 2020-11-16 PROCEDURE — C9803 HOPD COVID-19 SPEC COLLECT: HCPCS

## 2020-11-17 LAB — SARS-COV-2 RNA RESP QL NAA+PROBE: NOT DETECTED

## 2020-11-18 ENCOUNTER — HOSPITAL ENCOUNTER (OUTPATIENT)
Dept: GENERAL RADIOLOGY | Facility: HOSPITAL | Age: 85
Discharge: HOME | End: 2020-11-18
Admitting: INTERNAL MEDICINE

## 2020-11-18 DIAGNOSIS — K21.9 GASTROESOPHAGEAL REFLUX DISEASE WITHOUT ESOPHAGITIS: ICD-10-CM

## 2020-11-18 DIAGNOSIS — R13.19 ESOPHAGEAL DYSPHAGIA: ICD-10-CM

## 2020-11-18 PROCEDURE — 74221 X-RAY XM ESOPHAGUS 2CNTRST: CPT

## 2020-11-18 PROCEDURE — 63710000001 BARIUM SULFATE 700 MG TABLET: Performed by: INTERNAL MEDICINE

## 2020-11-18 PROCEDURE — A9270 NON-COVERED ITEM OR SERVICE: HCPCS | Performed by: INTERNAL MEDICINE

## 2020-11-18 PROCEDURE — 63710000001 BARIUM SULFATE 98 % RECONSTITUTED SUSPENSION: Performed by: INTERNAL MEDICINE

## 2020-11-18 PROCEDURE — 63710000001 BARIUM SULFATE 96 % RECONSTITUTED SUSPENSION: Performed by: INTERNAL MEDICINE

## 2020-11-18 PROCEDURE — 63710000001 SOD BICARB-CITRIC ACID-SIMETHICONE 2.21-1.53-0.04 G PACK: Performed by: INTERNAL MEDICINE

## 2020-11-18 RX ADMIN — BARIUM SULFATE 700 MG: 700 TABLET ORAL at 08:30

## 2020-11-18 RX ADMIN — BARIUM SULFATE 183 ML: 960 POWDER, FOR SUSPENSION ORAL at 08:30

## 2020-11-18 RX ADMIN — BARIUM SULFATE 135 ML: 980 POWDER, FOR SUSPENSION ORAL at 08:30

## 2020-11-18 RX ADMIN — ANTACID/ANTIFLATULENT 1 TABLET: 380; 550; 10; 10 GRANULE, EFFERVESCENT ORAL at 08:30

## 2020-11-19 ENCOUNTER — PREP FOR SURGERY (OUTPATIENT)
Dept: OTHER | Facility: HOSPITAL | Age: 85
End: 2020-11-19

## 2020-11-19 DIAGNOSIS — K21.9 GASTROESOPHAGEAL REFLUX DISEASE WITHOUT ESOPHAGITIS: ICD-10-CM

## 2020-11-19 DIAGNOSIS — R13.19 ESOPHAGEAL DYSPHAGIA: Primary | ICD-10-CM

## 2020-11-20 RX ORDER — SITAGLIPTIN 100 MG/1
TABLET, FILM COATED ORAL
Qty: 90 TABLET | Refills: 0 | Status: SHIPPED | OUTPATIENT
Start: 2020-11-20

## 2020-12-03 RX ORDER — INSULIN GLARGINE 100 [IU]/ML
25 INJECTION, SOLUTION SUBCUTANEOUS NIGHTLY
Qty: 3 PEN | Refills: 1 | Status: SHIPPED | OUTPATIENT
Start: 2020-12-03

## 2020-12-10 PROBLEM — E66.3 OVERWEIGHT: Status: ACTIVE | Noted: 2020-12-10

## 2020-12-14 ENCOUNTER — LAB REQUISITION (OUTPATIENT)
Dept: LAB | Facility: HOSPITAL | Age: 85
End: 2020-12-14

## 2020-12-14 DIAGNOSIS — Z00.00 ENCOUNTER FOR GENERAL ADULT MEDICAL EXAMINATION WITHOUT ABNORMAL FINDINGS: ICD-10-CM

## 2020-12-14 PROCEDURE — U0004 COV-19 TEST NON-CDC HGH THRU: HCPCS | Performed by: INTERNAL MEDICINE

## 2020-12-15 LAB — SARS-COV-2 RNA RESP QL NAA+PROBE: NOT DETECTED

## 2020-12-16 ENCOUNTER — LAB REQUISITION (OUTPATIENT)
Dept: LAB | Facility: HOSPITAL | Age: 85
End: 2020-12-16

## 2020-12-16 ENCOUNTER — OUTSIDE FACILITY SERVICE (OUTPATIENT)
Dept: GASTROENTEROLOGY | Facility: CLINIC | Age: 85
End: 2020-12-16

## 2020-12-16 DIAGNOSIS — R13.10 DYSPHAGIA, UNSPECIFIED: ICD-10-CM

## 2020-12-16 PROCEDURE — 43239 EGD BIOPSY SINGLE/MULTIPLE: CPT | Performed by: INTERNAL MEDICINE

## 2020-12-16 PROCEDURE — 88342 IMHCHEM/IMCYTCHM 1ST ANTB: CPT | Performed by: INTERNAL MEDICINE

## 2020-12-16 PROCEDURE — 88305 TISSUE EXAM BY PATHOLOGIST: CPT | Performed by: INTERNAL MEDICINE

## 2020-12-17 LAB
CYTO UR: NORMAL
LAB AP CASE REPORT: NORMAL
PATH REPORT.FINAL DX SPEC: NORMAL
PATH REPORT.GROSS SPEC: NORMAL

## 2020-12-18 LAB
CYTO UR: NORMAL
LAB AP CASE REPORT: NORMAL
LAB AP CLINICAL INFORMATION: NORMAL
PATH REPORT.ADDENDUM SPEC: NORMAL
PATH REPORT.FINAL DX SPEC: NORMAL
PATH REPORT.GROSS SPEC: NORMAL

## 2020-12-29 ENCOUNTER — APPOINTMENT (OUTPATIENT)
Dept: CT IMAGING | Facility: HOSPITAL | Age: 85
End: 2020-12-29

## 2020-12-29 ENCOUNTER — HOSPITAL ENCOUNTER (EMERGENCY)
Facility: HOSPITAL | Age: 85
Discharge: HOME | End: 2020-12-30
Attending: EMERGENCY MEDICINE | Admitting: EMERGENCY MEDICINE

## 2020-12-29 DIAGNOSIS — S01.01XA SCALP LACERATION, INITIAL ENCOUNTER: Primary | ICD-10-CM

## 2020-12-29 PROCEDURE — 99284 EMERGENCY DEPT VISIT MOD MDM: CPT

## 2020-12-29 PROCEDURE — 72125 CT NECK SPINE W/O DYE: CPT

## 2020-12-29 PROCEDURE — 70450 CT HEAD/BRAIN W/O DYE: CPT

## 2020-12-29 RX ORDER — LIDOCAINE HYDROCHLORIDE AND EPINEPHRINE 10; 10 MG/ML; UG/ML
10 INJECTION, SOLUTION INFILTRATION; PERINEURAL ONCE
Status: COMPLETED | OUTPATIENT
Start: 2020-12-29 | End: 2020-12-29

## 2020-12-29 RX ORDER — ACETAMINOPHEN 500 MG
1000 TABLET ORAL ONCE
Status: COMPLETED | OUTPATIENT
Start: 2020-12-29 | End: 2020-12-29

## 2020-12-29 RX ADMIN — ACETAMINOPHEN 1000 MG: 500 TABLET ORAL at 23:34

## 2020-12-29 RX ADMIN — LIDOCAINE HYDROCHLORIDE AND EPINEPHRINE 10 ML: 10; 10 INJECTION, SOLUTION INFILTRATION; PERINEURAL at 22:37

## 2020-12-30 VITALS
TEMPERATURE: 98.6 F | RESPIRATION RATE: 17 BRPM | SYSTOLIC BLOOD PRESSURE: 192 MMHG | DIASTOLIC BLOOD PRESSURE: 97 MMHG | OXYGEN SATURATION: 93 % | HEART RATE: 97 BPM

## 2020-12-30 PROCEDURE — 90471 IMMUNIZATION ADMIN: CPT | Performed by: EMERGENCY MEDICINE

## 2020-12-30 PROCEDURE — 25010000002 TDAP 5-2.5-18.5 LF-MCG/0.5 SUSPENSION: Performed by: EMERGENCY MEDICINE

## 2020-12-30 PROCEDURE — 90715 TDAP VACCINE 7 YRS/> IM: CPT | Performed by: EMERGENCY MEDICINE

## 2020-12-30 RX ADMIN — TETANUS TOXOID, REDUCED DIPHTHERIA TOXOID AND ACELLULAR PERTUSSIS VACCINE, ADSORBED 0.5 ML: 5; 2.5; 8; 8; 2.5 SUSPENSION INTRAMUSCULAR at 00:11

## 2021-01-15 RX ORDER — DULOXETIN HYDROCHLORIDE 30 MG/1
CAPSULE, DELAYED RELEASE ORAL
Qty: 30 CAPSULE | Refills: 1 | Status: SHIPPED | OUTPATIENT
Start: 2021-01-15

## 2021-01-15 NOTE — TELEPHONE ENCOUNTER
Patient sent [facility] message regarding refill request. Advised that she will need to find a new PCP prior to any further refills. I gave her the number to our office and advised that we are accepting new patients.  30 days of medication sent to pharmacy on file.

## 2021-01-28 ENCOUNTER — OFFICE VISIT (OUTPATIENT)
Dept: GASTROENTEROLOGY | Facility: CLINIC | Age: 86
End: 2021-01-28

## 2021-01-28 DIAGNOSIS — K44.9 HIATAL HERNIA: Chronic | ICD-10-CM

## 2021-01-28 DIAGNOSIS — K21.00 GASTROESOPHAGEAL REFLUX DISEASE WITH ESOPHAGITIS WITHOUT HEMORRHAGE: Primary | Chronic | ICD-10-CM

## 2021-01-28 DIAGNOSIS — Z87.19 HISTORY OF DIVERTICULITIS: ICD-10-CM

## 2021-01-28 DIAGNOSIS — K57.90 DIVERTICULOSIS: Chronic | ICD-10-CM

## 2021-01-28 PROCEDURE — 99442 PR PHYS/QHP TELEPHONE EVALUATION 11-20 MIN: CPT | Performed by: NURSE PRACTITIONER

## 2021-01-28 NOTE — PROGRESS NOTES
Chief Complaint   Patient presents with   • Follow-up     after EGD             History of Present Illness  85-year-old female presents today for telephone follow-up.  She was last seen in office on 11/11/2020.  She has a history of dysphagia, GERD, and previous colon resection for diverticulitis.    EGD revealed esophageal plaques, a 2 cm hiatal hernia, LA grade a esophagitis, mild Schatzki's ring, and erythematous mucosa in the stomach.  Random stomach biopsy revealed mild chronic inactive gastritis and reactive epithelial changes with intestinal metaplasia.  Esophagus biopsy at 35 cm revealed squamocolumnar mucosa with basal layer hyperplasia and increased intraepithelial lymphocytes up to 8 per high powered field consistent with findings of reflux esophagitis.    She continues pantoprazole 40 mg once daily for GERD with fairly good control of symptoms. She will experience epigastric pain about 1 x per week and has noticed that tomatoes or tomato based products worsen symptoms.  She does not currently take any OTC medications for intermittent epigastric pain/burning.  She denies any nausea, vomiting, or dysphagia.  Her weight is stable at 148 pounds.  She reports a good appetite.    She reports having regular daily bowel movements and denies having any diarrhea, constipation, melena, or hematochezia.  She denies any personal history of colon polyps or family history of colon cancer.  She no longer requires screening colonoscopies due to age.    You have chosen to receive care through a telephone visit.   Do you consent to use a telephone visit for your medical care today? Yes    Review of Systems   HENT: Negative for trouble swallowing.    Cardiovascular: Negative for chest pain.   Gastrointestinal: Positive for abdominal pain. Negative for constipation and diarrhea.         Result Review :        ENDOSCOPY, INT (12/16/2020)  Tissue Pathology Exam (12/16/2020 10:41)  Non-gynecologic Cytology (12/16/2020  10:37)      Physical Exam - TELEPHONE VISIT     Assessment and Plan      Diagnoses and all orders for this visit:    1. Gastroesophageal reflux disease with esophagitis without hemorrhage (Primary)    2. Hiatal hernia    3. History of diverticulitis    4. Diverticulosis           This visit has been rescheduled as a phone visit to comply with patient safety concerns in accordance with CDC recommendations. Total time of discussion was 12 minutes.    Follow Up   Return in about 1 year (around 1/28/2022).     Patient Instructions   1.  For GERD, esophagitis, and hiatal hernia continue pantoprazole 40 mg once a day.  We recommend changing the timing of this medication and taking this 1 hour before your evening meal.    2.  For GERD, we recommend avoiding eating 3-4 hours before bedtime, eating smaller more frequent meals, and avoiding any known food triggers including spicy foods, tomatoes and tomato-based sauces, chocolate, coffee/tea, citrus fruits, carbonated  beverages and alcohol.     3.  For diverticulosis, we recommend maintaining a high-fiber diet.    4.  Recommend annual office follow-up for reassessment of symptoms and medication refills, or sooner should symptoms worsen or fail to improve.     Discussion:    EGD findings and pathology reviewed with patient today during her telephone follow-up visit.  Overall pantoprazole works well for management of symptoms, but she does experience epigastric burning in the evening approximately 1 night per week.  Sucralfate was offered to patient for intermittent epigastric discomfort, but patient deferred.  Could consider use of this in the future if symptoms worsen.    For diverticulosis, patient was recommended to follow a high-fiber diet.  She no longer requires screening colonoscopies due to age.  She does not have any family history of colon cancer or personal history of colon polyps.  Need for annual office follow-up for reassessment of symptoms and medication  refills, or sooner should symptoms worsen or fail to improve.  Patient verbalized understanding of above plan of care and is in agreement.  All questions answered and support provided.

## 2021-01-28 NOTE — PATIENT INSTRUCTIONS
1.  For GERD, esophagitis, and hiatal hernia continue pantoprazole 40 mg once a day.  We recommend changing the timing of this medication and taking this 1 hour before your evening meal.    2.  For GERD, we recommend avoiding eating 3-4 hours before bedtime, eating smaller more frequent meals, and avoiding any known food triggers including spicy foods, tomatoes and tomato-based sauces, chocolate, coffee/tea, citrus fruits, carbonated  beverages and alcohol.     3.  For diverticulosis, we recommend maintaining a high-fiber diet.    4.  Recommend annual office follow-up for reassessment of symptoms and medication refills, or sooner should symptoms worsen or fail to improve.

## 2021-03-02 DIAGNOSIS — Z23 IMMUNIZATION DUE: ICD-10-CM

## 2021-03-11 ENCOUNTER — IMMUNIZATION (OUTPATIENT)
Dept: VACCINE CLINIC | Facility: HOSPITAL | Age: 86
End: 2021-03-11

## 2021-03-11 DIAGNOSIS — Z23 IMMUNIZATION DUE: ICD-10-CM

## 2021-03-11 PROCEDURE — 0001A: CPT | Performed by: INTERNAL MEDICINE

## 2021-03-11 PROCEDURE — 91300 HC SARSCOV02 VAC 30MCG/0.3ML IM: CPT | Performed by: INTERNAL MEDICINE

## 2021-04-01 ENCOUNTER — IMMUNIZATION (OUTPATIENT)
Dept: VACCINE CLINIC | Facility: HOSPITAL | Age: 86
End: 2021-04-01

## 2021-04-01 PROCEDURE — 0002A: CPT | Performed by: INTERNAL MEDICINE

## 2021-04-01 PROCEDURE — 91300 HC SARSCOV02 VAC 30MCG/0.3ML IM: CPT | Performed by: INTERNAL MEDICINE

## 2021-11-30 ENCOUNTER — TRANSCRIBE ORDERS (OUTPATIENT)
Dept: ADMINISTRATIVE | Facility: HOSPITAL | Age: 86
End: 2021-11-30

## 2021-11-30 DIAGNOSIS — R00.2 PALPITATIONS: Primary | ICD-10-CM

## 2021-11-30 DIAGNOSIS — R42 DIZZINESS: ICD-10-CM

## 2021-12-02 ENCOUNTER — HOSPITAL ENCOUNTER (OUTPATIENT)
Dept: CARDIOLOGY | Facility: HOSPITAL | Age: 86
Discharge: HOME | End: 2021-12-02
Admitting: INTERNAL MEDICINE

## 2021-12-02 DIAGNOSIS — R42 DIZZINESS: ICD-10-CM

## 2021-12-02 DIAGNOSIS — R00.2 PALPITATIONS: ICD-10-CM

## 2021-12-02 PROCEDURE — 93226 XTRNL ECG REC<48 HR SCAN A/R: CPT

## 2021-12-02 PROCEDURE — 93225 XTRNL ECG REC<48 HRS REC: CPT

## 2021-12-06 LAB
MAXIMAL PREDICTED HEART RATE: 134 BPM
STRESS TARGET HR: 114 BPM

## 2021-12-06 PROCEDURE — 93227 XTRNL ECG REC<48 HR R&I: CPT | Performed by: INTERNAL MEDICINE

## 2021-12-10 ENCOUNTER — IMMUNIZATION (OUTPATIENT)
Dept: VACCINE CLINIC | Facility: HOSPITAL | Age: 86
End: 2021-12-10

## 2021-12-10 PROCEDURE — 0004A HC ADM SARSCOV2 30MCG/0.3ML BOOSTER: CPT | Performed by: INTERNAL MEDICINE

## 2021-12-10 PROCEDURE — 91300 HC SARSCOV02 VAC 30MCG/0.3ML IM: CPT | Performed by: INTERNAL MEDICINE

## 2022-10-06 NOTE — H&P (VIEW-ONLY)
Subjective   Patient ID: Ashli Eaton is a 86 y.o. female is being seen for consultation today at the request of Nellie Larkin MD for a T12 VCF.  MRI lumbar/thoracic spine done on 9/13/22.  Today pt reports back pain that does not radiate , also has weakness and keeps her awake at times.  History of Present Illness  86-year-old female with history of hypertension, hyperlipidemia and type 2 diabetes all medically managed who fell on 9/7/2022 after tripping with immediate severe back pain developing.  Work-up demonstrated an L1 vertebral compression fracture which is acute to subacute by MRI assessment.  Patient has tried a brace and conservative therapy with no relief.  Patient's pain is still 9 out of 10 on the pain scale and she feels she cannot stand or put any significant weight on her feet due to the fracture.  No pain radiation into the legs is reported.  Patient is a non-smoker and not on any long-term steroids.  Patient is here to discuss a balloon kyphoplasty for possible treatment and pain relief.      The following portions of the patient's history were reviewed and updated as appropriate:   She  has a past medical history of Arthritis, Diabetes mellitus (HCC), Encounter for Medicare annual wellness exam, GERD (gastroesophageal reflux disease), Hyperlipidemia, Hypertension, and Non-smoker.  She does not have any pertinent problems on file.  She  has a past surgical history that includes Appendectomy; Cholecystectomy; Hysterectomy (05/1974); Cataract extraction (Bilateral); Hand surgery (06/1992); Knee surgery (06/1981); Oophorectomy (05/1975); Colonoscopy (12/07/2004); Colectomy partial / total (11/1985); and Esophagogastroduodenoscopy.  Her family history includes Cancer in her mother and another family member; Colon cancer in her brother; Coronary artery disease in her brother; Hypertension in her brother; Prostate cancer in her father; Suicidality in her father.  She  reports that she has never  smoked. She has never used smokeless tobacco. She reports current alcohol use. She reports that she does not use drugs.  Current Outpatient Medications   Medication Sig Dispense Refill   • DULoxetine (CYMBALTA) 30 MG capsule TAKE TWO CAPSULES BY MOUTH DAILY 30 capsule 1   • fesoterodine fumarate (TOVIAZ ER) 4 MG tablet sustained-release 24 hour capsule Take  by mouth Daily.     • irbesartan (AVAPRO) 300 MG tablet      • Januvia 100 MG tablet TAKE 1 TABLET DAILY 90 tablet 0   • Lantus SoloStar 100 UNIT/ML injection pen INJECT 25 UNITS UNDER THE SKIN INTO THE APPROPRIATE AREA AS DIRECTED EVERY NIGHT 3 pen 1   • levothyroxine (SYNTHROID, LEVOTHROID) 50 MCG tablet Take 25 mcg by mouth Daily.     • metFORMIN (GLUCOPHAGE) 500 MG tablet Take  by mouth.     • rosuvastatin (CRESTOR) 40 MG tablet Take 40 mg by mouth Daily.     • pantoprazole (PROTONIX) 40 MG EC tablet Take 1 tablet by mouth Daily. 90 tablet 1     No current facility-administered medications for this visit.     Current Outpatient Medications on File Prior to Visit   Medication Sig   • DULoxetine (CYMBALTA) 30 MG capsule TAKE TWO CAPSULES BY MOUTH DAILY   • fesoterodine fumarate (TOVIAZ ER) 4 MG tablet sustained-release 24 hour capsule Take  by mouth Daily.   • irbesartan (AVAPRO) 300 MG tablet    • Januvia 100 MG tablet TAKE 1 TABLET DAILY   • Lantus SoloStar 100 UNIT/ML injection pen INJECT 25 UNITS UNDER THE SKIN INTO THE APPROPRIATE AREA AS DIRECTED EVERY NIGHT   • levothyroxine (SYNTHROID, LEVOTHROID) 50 MCG tablet Take 25 mcg by mouth Daily.   • metFORMIN (GLUCOPHAGE) 500 MG tablet Take  by mouth.   • rosuvastatin (CRESTOR) 40 MG tablet Take 40 mg by mouth Daily.   • pantoprazole (PROTONIX) 40 MG EC tablet Take 1 tablet by mouth Daily.     No current facility-administered medications on file prior to visit.     She is allergic to sulfa antibiotics and penicillins..    Review of Systems   Constitutional: Negative for chills and fever.   HENT: Negative for  ear pain and tinnitus.    Eyes: Negative for pain and visual disturbance.   Respiratory: Negative for cough and shortness of breath.    Cardiovascular: Negative for chest pain and palpitations.   Gastrointestinal: Negative for nausea and vomiting.        No b/b incontinence   Genitourinary: Negative for difficulty urinating and enuresis.   Musculoskeletal: Positive for back pain and gait problem.   Skin: Negative for rash.   Neurological: Positive for weakness (back). Negative for numbness.   Psychiatric/Behavioral: Positive for sleep disturbance.       Objective   Physical Exam  Vitals and nursing note reviewed.   Constitutional:       General: She is in acute distress.   HENT:      Head: Normocephalic.      Nose: Nose normal.      Mouth/Throat:      Mouth: Mucous membranes are moist.   Eyes:      Extraocular Movements: Extraocular movements intact.      Conjunctiva/sclera: Conjunctivae normal.      Pupils: Pupils are equal, round, and reactive to light.   Cardiovascular:      Rate and Rhythm: Normal rate.   Pulmonary:      Effort: Pulmonary effort is normal.   Musculoskeletal:      Cervical back: Normal range of motion.      Lumbar back: Tenderness and bony tenderness present.        Back:    Skin:     General: Skin is warm and dry.   Neurological:      General: No focal deficit present.      Mental Status: She is alert and oriented to person, place, and time.      Cranial Nerves: No cranial nerve deficit.      Sensory: No sensory deficit.      Motor: No weakness.      Coordination: Coordination normal.   Psychiatric:         Mood and Affect: Mood normal.         Behavior: Behavior normal.         Thought Content: Thought content normal.         Judgment: Judgment normal.       Neurologic Exam     Mental Status   Oriented to person, place, and time.     Cranial Nerves     CN III, IV, VI   Pupils are equal, round, and reactive to light.      Assessment & Plan   Independent Review of Radiographic Studies:    The MRI  of the lumbar spine without contrast dated 2022 was reviewed with the patient and shows the acute to subacute compression fracture involving the L1 vertebral body with marrow edema seen.  At T12 the hemangioma with an older compression fracture is also noted.  Medical Decision Makin-year-old female with osteoporosis and delayed healing of an L1 compression fracture with continued severe pain and immobility.  Patient wishes to proceed with a kyphoplasty procedure.  The risks, alternatives and procedure explained with the patient agreeing to proceed.  This will be performed under general anesthesia in the near future.  Patient's hypertension and hyperlipidemia are otherwise controlled medically and she has type 2 diabetes on metformin.  She is a non-smoker and reminded to avoid any tobacco products.  Diagnoses and all orders for this visit:    1. Compression fracture of L1 vertebra, initial encounter ([facility]) (Primary)  -     Case Request; Standing  -     Basic Metabolic Panel; Future  -     CBC & Differential; Future  -     ceFAZolin (ANCEF) 2 g in sodium chloride 0.9 % 100 mL IVPB  -     Case Request    Other orders  -     Follow Anesthesia Guidelines / Protocol; Future  -     Obtain Informed Consent; Future  -     Follow Anesthesia Guidelines / Protocol; Standing  -     Provide NPO Instructions to Patient  -     Notify Physician - Standard; Standing  -     Verify NPO Status; Standing  -     Obtain Informed Consent (If Not Done Inpatient or PAT); Standing      Return in about 1 week (around 10/18/2022) for Kyphoplasty Lumbar 1.

## 2022-10-06 NOTE — PROGRESS NOTES
Subjective   Patient ID: Ashli Eaton is a 86 y.o. female is being seen for consultation today at the request of Nellie Larkin MD for a T12 VCF.  MRI lumbar/thoracic spine done on 9/13/22.  Today pt reports back pain that does not radiate , also has weakness and keeps her awake at times.  History of Present Illness  86-year-old female with history of hypertension, hyperlipidemia and type 2 diabetes all medically managed who fell on 9/7/2022 after tripping with immediate severe back pain developing.  Work-up demonstrated an L1 vertebral compression fracture which is acute to subacute by MRI assessment.  Patient has tried a brace and conservative therapy with no relief.  Patient's pain is still 9 out of 10 on the pain scale and she feels she cannot stand or put any significant weight on her feet due to the fracture.  No pain radiation into the legs is reported.  Patient is a non-smoker and not on any long-term steroids.  Patient is here to discuss a balloon kyphoplasty for possible treatment and pain relief.      The following portions of the patient's history were reviewed and updated as appropriate:   She  has a past medical history of Arthritis, Diabetes mellitus (HCC), Encounter for Medicare annual wellness exam, GERD (gastroesophageal reflux disease), Hyperlipidemia, Hypertension, and Non-smoker.  She does not have any pertinent problems on file.  She  has a past surgical history that includes Appendectomy; Cholecystectomy; Hysterectomy (05/1974); Cataract extraction (Bilateral); Hand surgery (06/1992); Knee surgery (06/1981); Oophorectomy (05/1975); Colonoscopy (12/07/2004); Colectomy partial / total (11/1985); and Esophagogastroduodenoscopy.  Her family history includes Cancer in her mother and another family member; Colon cancer in her brother; Coronary artery disease in her brother; Hypertension in her brother; Prostate cancer in her father; Suicidality in her father.  She  reports that she has never  smoked. She has never used smokeless tobacco. She reports current alcohol use. She reports that she does not use drugs.  Current Outpatient Medications   Medication Sig Dispense Refill   • DULoxetine (CYMBALTA) 30 MG capsule TAKE TWO CAPSULES BY MOUTH DAILY 30 capsule 1   • fesoterodine fumarate (TOVIAZ ER) 4 MG tablet sustained-release 24 hour capsule Take  by mouth Daily.     • irbesartan (AVAPRO) 300 MG tablet      • Januvia 100 MG tablet TAKE 1 TABLET DAILY 90 tablet 0   • Lantus SoloStar 100 UNIT/ML injection pen INJECT 25 UNITS UNDER THE SKIN INTO THE APPROPRIATE AREA AS DIRECTED EVERY NIGHT 3 pen 1   • levothyroxine (SYNTHROID, LEVOTHROID) 50 MCG tablet Take 25 mcg by mouth Daily.     • metFORMIN (GLUCOPHAGE) 500 MG tablet Take  by mouth.     • rosuvastatin (CRESTOR) 40 MG tablet Take 40 mg by mouth Daily.     • pantoprazole (PROTONIX) 40 MG EC tablet Take 1 tablet by mouth Daily. 90 tablet 1     No current facility-administered medications for this visit.     Current Outpatient Medications on File Prior to Visit   Medication Sig   • DULoxetine (CYMBALTA) 30 MG capsule TAKE TWO CAPSULES BY MOUTH DAILY   • fesoterodine fumarate (TOVIAZ ER) 4 MG tablet sustained-release 24 hour capsule Take  by mouth Daily.   • irbesartan (AVAPRO) 300 MG tablet    • Januvia 100 MG tablet TAKE 1 TABLET DAILY   • Lantus SoloStar 100 UNIT/ML injection pen INJECT 25 UNITS UNDER THE SKIN INTO THE APPROPRIATE AREA AS DIRECTED EVERY NIGHT   • levothyroxine (SYNTHROID, LEVOTHROID) 50 MCG tablet Take 25 mcg by mouth Daily.   • metFORMIN (GLUCOPHAGE) 500 MG tablet Take  by mouth.   • rosuvastatin (CRESTOR) 40 MG tablet Take 40 mg by mouth Daily.   • pantoprazole (PROTONIX) 40 MG EC tablet Take 1 tablet by mouth Daily.     No current facility-administered medications on file prior to visit.     She is allergic to sulfa antibiotics and penicillins..    Review of Systems   Constitutional: Negative for chills and fever.   HENT: Negative for  ear pain and tinnitus.    Eyes: Negative for pain and visual disturbance.   Respiratory: Negative for cough and shortness of breath.    Cardiovascular: Negative for chest pain and palpitations.   Gastrointestinal: Negative for nausea and vomiting.        No b/b incontinence   Genitourinary: Negative for difficulty urinating and enuresis.   Musculoskeletal: Positive for back pain and gait problem.   Skin: Negative for rash.   Neurological: Positive for weakness (back). Negative for numbness.   Psychiatric/Behavioral: Positive for sleep disturbance.       Objective   Physical Exam  Vitals and nursing note reviewed.   Constitutional:       General: She is in acute distress.   HENT:      Head: Normocephalic.      Nose: Nose normal.      Mouth/Throat:      Mouth: Mucous membranes are moist.   Eyes:      Extraocular Movements: Extraocular movements intact.      Conjunctiva/sclera: Conjunctivae normal.      Pupils: Pupils are equal, round, and reactive to light.   Cardiovascular:      Rate and Rhythm: Normal rate.   Pulmonary:      Effort: Pulmonary effort is normal.   Musculoskeletal:      Cervical back: Normal range of motion.      Lumbar back: Tenderness and bony tenderness present.        Back:    Skin:     General: Skin is warm and dry.   Neurological:      General: No focal deficit present.      Mental Status: She is alert and oriented to person, place, and time.      Cranial Nerves: No cranial nerve deficit.      Sensory: No sensory deficit.      Motor: No weakness.      Coordination: Coordination normal.   Psychiatric:         Mood and Affect: Mood normal.         Behavior: Behavior normal.         Thought Content: Thought content normal.         Judgment: Judgment normal.       Neurologic Exam     Mental Status   Oriented to person, place, and time.     Cranial Nerves     CN III, IV, VI   Pupils are equal, round, and reactive to light.      Assessment & Plan   Independent Review of Radiographic Studies:    The MRI  of the lumbar spine without contrast dated 2022 was reviewed with the patient and shows the acute to subacute compression fracture involving the L1 vertebral body with marrow edema seen.  At T12 the hemangioma with an older compression fracture is also noted.  Medical Decision Makin-year-old female with osteoporosis and delayed healing of an L1 compression fracture with continued severe pain and immobility.  Patient wishes to proceed with a kyphoplasty procedure.  The risks, alternatives and procedure explained with the patient agreeing to proceed.  This will be performed under general anesthesia in the near future.  Patient's hypertension and hyperlipidemia are otherwise controlled medically and she has type 2 diabetes on metformin.  She is a non-smoker and reminded to avoid any tobacco products.  Diagnoses and all orders for this visit:    1. Compression fracture of L1 vertebra, initial encounter ([facility]) (Primary)  -     Case Request; Standing  -     Basic Metabolic Panel; Future  -     CBC & Differential; Future  -     ceFAZolin (ANCEF) 2 g in sodium chloride 0.9 % 100 mL IVPB  -     Case Request    Other orders  -     Follow Anesthesia Guidelines / Protocol; Future  -     Obtain Informed Consent; Future  -     Follow Anesthesia Guidelines / Protocol; Standing  -     Provide NPO Instructions to Patient  -     Notify Physician - Standard; Standing  -     Verify NPO Status; Standing  -     Obtain Informed Consent (If Not Done Inpatient or PAT); Standing      Return in about 1 week (around 10/18/2022) for Kyphoplasty Lumbar 1.

## 2022-10-11 ENCOUNTER — OFFICE VISIT (OUTPATIENT)
Dept: NEUROSURGERY | Facility: CLINIC | Age: 87
End: 2022-10-11

## 2022-10-11 VITALS
RESPIRATION RATE: 20 BRPM | WEIGHT: 158 LBS | HEIGHT: 62 IN | DIASTOLIC BLOOD PRESSURE: 86 MMHG | HEART RATE: 108 BPM | TEMPERATURE: 97.8 F | BODY MASS INDEX: 29.08 KG/M2 | OXYGEN SATURATION: 96 % | SYSTOLIC BLOOD PRESSURE: 120 MMHG

## 2022-10-11 DIAGNOSIS — S32.010A COMPRESSION FRACTURE OF L1 VERTEBRA, INITIAL ENCOUNTER: Primary | ICD-10-CM

## 2022-10-11 PROCEDURE — 99204 OFFICE O/P NEW MOD 45 MIN: CPT | Performed by: RADIOLOGY

## 2022-10-11 RX ORDER — CEFAZOLIN SODIUM 2 G/100ML
2 INJECTION, SOLUTION INTRAVENOUS ONCE
Status: CANCELLED | OUTPATIENT
Start: 2022-10-19 | End: 2022-10-11

## 2022-10-12 ENCOUNTER — PATIENT ROUNDING (BHMG ONLY) (OUTPATIENT)
Dept: NEUROSURGERY | Facility: CLINIC | Age: 87
End: 2022-10-12

## 2022-10-17 ENCOUNTER — PRE-ADMISSION TESTING (OUTPATIENT)
Dept: PREADMISSION TESTING | Facility: HOSPITAL | Age: 87
End: 2022-10-17

## 2022-10-17 VITALS
BODY MASS INDEX: 29.83 KG/M2 | WEIGHT: 162.1 LBS | SYSTOLIC BLOOD PRESSURE: 148 MMHG | DIASTOLIC BLOOD PRESSURE: 91 MMHG | HEIGHT: 62 IN | OXYGEN SATURATION: 99 % | RESPIRATION RATE: 16 BRPM | TEMPERATURE: 98.4 F | HEART RATE: 110 BPM

## 2022-10-17 DIAGNOSIS — S32.010A COMPRESSION FRACTURE OF L1 VERTEBRA, INITIAL ENCOUNTER: ICD-10-CM

## 2022-10-17 LAB
ANION GAP SERPL CALCULATED.3IONS-SCNC: 11.2 MMOL/L (ref 5–15)
BASOPHILS # BLD AUTO: 0.06 10*3/MM3 (ref 0–0.2)
BASOPHILS NFR BLD AUTO: 0.9 % (ref 0–1.5)
BUN SERPL-MCNC: 9 MG/DL (ref 8–23)
BUN/CREAT SERPL: 8.7 (ref 7–25)
CALCIUM SPEC-SCNC: 9.7 MG/DL (ref 8.6–10.5)
CHLORIDE SERPL-SCNC: 107 MMOL/L (ref 98–107)
CO2 SERPL-SCNC: 21.8 MMOL/L (ref 22–29)
CREAT SERPL-MCNC: 1.04 MG/DL (ref 0.57–1)
DEPRECATED RDW RBC AUTO: 44.4 FL (ref 37–54)
EGFRCR SERPLBLD CKD-EPI 2021: 52.5 ML/MIN/1.73
EOSINOPHIL # BLD AUTO: 0.21 10*3/MM3 (ref 0–0.4)
EOSINOPHIL NFR BLD AUTO: 3.1 % (ref 0.3–6.2)
ERYTHROCYTE [DISTWIDTH] IN BLOOD BY AUTOMATED COUNT: 14 % (ref 12.3–15.4)
GLUCOSE SERPL-MCNC: 207 MG/DL (ref 65–99)
HCT VFR BLD AUTO: 44.6 % (ref 34–46.6)
HGB BLD-MCNC: 15.2 G/DL (ref 12–15.9)
IMM GRANULOCYTES # BLD AUTO: 0.03 10*3/MM3 (ref 0–0.05)
IMM GRANULOCYTES NFR BLD AUTO: 0.4 % (ref 0–0.5)
LYMPHOCYTES # BLD AUTO: 2.36 10*3/MM3 (ref 0.7–3.1)
LYMPHOCYTES NFR BLD AUTO: 35 % (ref 19.6–45.3)
MCH RBC QN AUTO: 30.1 PG (ref 26.6–33)
MCHC RBC AUTO-ENTMCNC: 34.1 G/DL (ref 31.5–35.7)
MCV RBC AUTO: 88.3 FL (ref 79–97)
MONOCYTES # BLD AUTO: 0.49 10*3/MM3 (ref 0.1–0.9)
MONOCYTES NFR BLD AUTO: 7.3 % (ref 5–12)
NEUTROPHILS NFR BLD AUTO: 3.59 10*3/MM3 (ref 1.7–7)
NEUTROPHILS NFR BLD AUTO: 53.3 % (ref 42.7–76)
NRBC BLD AUTO-RTO: 0 /100 WBC (ref 0–0.2)
PLATELET # BLD AUTO: 250 10*3/MM3 (ref 140–450)
PMV BLD AUTO: 10.3 FL (ref 6–12)
POTASSIUM SERPL-SCNC: 3.8 MMOL/L (ref 3.5–5.2)
QT INTERVAL: 369 MS
RBC # BLD AUTO: 5.05 10*6/MM3 (ref 3.77–5.28)
SODIUM SERPL-SCNC: 140 MMOL/L (ref 136–145)
WBC NRBC COR # BLD: 6.74 10*3/MM3 (ref 3.4–10.8)

## 2022-10-17 PROCEDURE — 93005 ELECTROCARDIOGRAM TRACING: CPT

## 2022-10-17 PROCEDURE — 36415 COLL VENOUS BLD VENIPUNCTURE: CPT

## 2022-10-17 PROCEDURE — 93010 ELECTROCARDIOGRAM REPORT: CPT | Performed by: INTERNAL MEDICINE

## 2022-10-17 PROCEDURE — 80048 BASIC METABOLIC PNL TOTAL CA: CPT

## 2022-10-17 PROCEDURE — 85025 COMPLETE CBC W/AUTO DIFF WBC: CPT

## 2022-10-17 RX ORDER — HYDROCODONE BITARTRATE AND ACETAMINOPHEN 5; 325 MG/1; MG/1
1 TABLET ORAL EVERY 6 HOURS PRN
COMMUNITY
Start: 2022-09-14

## 2022-10-17 RX ORDER — PREDNISONE 1 MG/1
TABLET ORAL
COMMUNITY
Start: 2022-09-05 | End: 2022-10-17

## 2022-10-17 RX ORDER — PREGABALIN 75 MG/1
75 CAPSULE ORAL 2 TIMES DAILY
COMMUNITY
Start: 2022-10-08

## 2022-10-17 RX ORDER — DULOXETIN HYDROCHLORIDE 60 MG/1
CAPSULE, DELAYED RELEASE ORAL
COMMUNITY
Start: 2022-09-22 | End: 2022-10-17

## 2022-10-17 RX ORDER — PHENOL 1.4 %
600 AEROSOL, SPRAY (ML) MUCOUS MEMBRANE DAILY
COMMUNITY

## 2022-10-17 RX ORDER — TRAMADOL HYDROCHLORIDE 50 MG/1
50 TABLET ORAL 2 TIMES DAILY PRN
COMMUNITY
Start: 2022-09-20

## 2022-10-17 RX ORDER — ATENOLOL 50 MG/1
50 TABLET ORAL EVERY MORNING
COMMUNITY
Start: 2022-07-15

## 2022-10-17 RX ORDER — FAMOTIDINE 20 MG/1
20 TABLET, FILM COATED ORAL AS NEEDED
COMMUNITY

## 2022-10-17 RX ORDER — AMLODIPINE BESYLATE 10 MG/1
10 TABLET ORAL EVERY MORNING
COMMUNITY
Start: 2022-08-23

## 2022-10-17 NOTE — DISCHARGE INSTRUCTIONS
Take the following medications the morning of surgery with a small sip of water:  AMLODIPINE, ATENOLOL, DULOXETINE, LEVOTHYROXINE, PREGABLIN      ARRIVE 8:00 AM  10/19      If you are on prescription narcotic pain medication to control your pain you may also take that medication the morning of surgery.    General Instructions:  Do not eat or drink anything after midnight the night before surgery.  Infants may have breast milk up to four hours before surgery.  Infants drinking formula may drink formula up to six hours before surgery.   Patients who avoid smoking, chewing tobacco and alcohol for 4 weeks prior to surgery have a reduced risk of post-operative complications.  Quit smoking as many days before surgery as you can.  Do not smoke, use chewing tobacco or drink alcohol the day of surgery.   If applicable bring your C-PAP/ BI-PAP machine.  Bring any papers given to you in the doctor’s office.  Wear clean comfortable clothes.  Do not wear contact lenses, false eyelashes or make-up.  Bring a case for your glasses.   Bring crutches or walker if applicable.  Remove all piercings.  Leave jewelry and any other valuables at home.  Hair extensions with metal clips must be removed prior to surgery.  The Pre-Admission Testing nurse will instruct you to bring medications if unable to obtain an accurate list in Pre-Admission Testing.        Preventing a Surgical Site Infection:  For 2 to 3 days before surgery, avoid shaving with a razor because the razor can irritate skin and make it easier to develop an infection.    Any areas of open skin can increase the risk of a post-operative wound infection by allowing bacteria to enter and travel throughout the body.  Notify your surgeon if you have any skin wounds / rashes even if it is not near the expected surgical site.  The area will need assessed to determine if surgery should be delayed until it is healed.  The night prior to surgery shower using a fresh bar of anti-bacterial  soap (such as Dial) and clean washcloth.  Sleep in a clean bed with clean clothing.  Do not allow pets to sleep with you.  Shower on the morning of surgery using a fresh bar of anti-bacterial soap (such as Dial) and clean washcloth.  Dry with a clean towel and dress in clean clothing.  Ask your surgeon if you will be receiving antibiotics prior to surgery.  Make sure you, your family, and all healthcare providers clean their hands with soap and water or an alcohol based hand  before caring for you or your wound.    Day of surgery:  Your arrival time is approximately two hours before your scheduled surgery time.  Upon arrival, a Pre-op nurse and Anesthesiologist will review your health history, obtain vital signs, and answer questions you may have.  The only belongings needed at this time will be your home medications and if applicable your C-PAP/BI-PAP machine.  A Pre-op nurse will start an IV and you may receive medication in preparation for surgery, including something to help you relax.      Please be aware that surgery does come with discomfort.  We want to make every effort to control your discomfort so please discuss any uncontrolled symptoms with your nurse.   Your doctor will most likely have prescribed pain medications.      If you are going home after surgery you will receive individualized written care instructions before being discharged.  A responsible adult must drive you to and from the hospital on the day of your surgery and stay with you for 24 hours.  Discharge prescriptions can be filled by the hospital pharmacy during regular pharmacy hours.  If you are having surgery late in the day/evening your prescription may be e-prescribed to your pharmacy.  Please verify your pharmacy hours or chose a 24 hour pharmacy to avoid not having access to your prescription because your pharmacy has closed for the day.    If you are staying overnight following surgery, you will be transported to your  hospital room following the recovery period.  [facility] has all private rooms.    If you have any questions please call Pre-Admission Testing at [phone].  Deductibles and co-payments are collected on the day of service. Please be prepared to pay the required co-pay, deductible or deposit on the day of service as defined by your plan.    Call your surgeon immediately if you experience any of the following symptoms:  Sore Throat  Shortness of Breath or difficulty breathing  Cough  Chills  Body soreness or muscle pain  Headache  Fever  New loss of taste or smell  Do not arrive for your surgery ill.  Your procedure will need to be rescheduled to another time.  You will need to call your physician before the day of surgery to avoid any unnecessary exposure to hospital staff as well as other patients.

## 2022-10-18 NOTE — PROGRESS NOTES
Subjective   Patient ID: Ashli Eaton is a 86 y.o. female is here today for a P/O L1 kyphoplasty done on 10/19/22.      History of Present Illness  86-year-old female with history of hypertension, hyperlipidemia and type 2 diabetes all medically managed who fell on 9/7/2022 after tripping with immediate severe back pain developing.  Work-up demonstrated an L1 vertebral compression fracture which is acute to subacute by MRI assessment.  Patient has tried a brace and conservative therapy with no relief.  Patient's pain was 9 out of 10 on the pain scale and she felt she could not stand or put any significant weight on her feet due to the fracture.  No pain radiation into the legs was reported.  Patient is a non-smoker and not on any long-term steroids.  Patient underwent a successful balloon kyphoplasty on 10/19/2022 with a good result radiographically.  Patient reports her pain has greatly improved with near complete resolution.  She returns today for her postoperative follow-up.      The following portions of the patient's history were reviewed and updated as appropriate:   She  has a past medical history of Arthritis, Back pain, CKD (chronic kidney disease), stage III (HCC), Compression fracture of L1 lumbar vertebra (HCC), Diabetes mellitus (HCC), Encounter for Medicare annual wellness exam, GERD (gastroesophageal reflux disease), History of dizziness, History of recent fall (09/07/2022), History of UTI, Hyperlipidemia, Hypertension, Hypothyroidism, and Non-smoker.  She does not have any pertinent problems on file.  She  has a past surgical history that includes Appendectomy; Cholecystectomy; Hysterectomy (05/1974); Cataract extraction (Bilateral); Hand surgery (06/1992); Knee surgery (Right, 06/1981); Oophorectomy (05/1975); Colonoscopy (12/07/2004); Colectomy partial / total (11/1985); Esophagogastroduodenoscopy; Tonsillectomy; and Kyphoplasty (N/A, 10/19/2022).  Her family history includes Cancer in her  mother and another family member; Colon cancer in her brother; Coronary artery disease in her brother; Hypertension in her brother; Prostate cancer in her father; Suicidality in her father.  She  reports that she has never smoked. She has never used smokeless tobacco. She reports current alcohol use. She reports that she does not use drugs.  Current Outpatient Medications   Medication Sig Dispense Refill   • amLODIPine (NORVASC) 10 MG tablet Take 1 tablet by mouth Every Morning.     • atenolol (TENORMIN) 50 MG tablet Take 1 tablet by mouth Every Morning.     • calcium carbonate (OS-VIRA) 600 MG tablet Take 1 tablet by mouth Daily.     • DULoxetine (CYMBALTA) 30 MG capsule TAKE TWO CAPSULES BY MOUTH DAILY (Patient taking differently: Take 2 capsules by mouth Every Morning.) 30 capsule 1   • famotidine (PEPCID) 20 MG tablet Take 1 tablet by mouth As Needed for Heartburn.     • fesoterodine fumarate (TOVIAZ ER) 4 MG tablet sustained-release 24 hour capsule Take 1 tablet by mouth Daily As Needed.     • HYDROcodone-acetaminophen (NORCO) 5-325 MG per tablet Take 1 tablet by mouth Every 6 (Six) Hours As Needed.     • irbesartan (AVAPRO) 300 MG tablet Take 1 tablet by mouth Every Morning.     • Januvia 100 MG tablet TAKE 1 TABLET DAILY (Patient taking differently: Take 1 tablet by mouth Every Morning.) 90 tablet 0   • Lantus SoloStar 100 UNIT/ML injection pen INJECT 25 UNITS UNDER THE SKIN INTO THE APPROPRIATE AREA AS DIRECTED EVERY NIGHT 3 pen 1   • levothyroxine (SYNTHROID, LEVOTHROID) 50 MCG tablet Take 1 tablet by mouth Every Morning.     • metFORMIN (GLUCOPHAGE) 500 MG tablet Take 1 tablet by mouth Daily With Breakfast.     • pregabalin (LYRICA) 75 MG capsule Take 1 capsule by mouth 2 (Two) Times a Day.     • rosuvastatin (CRESTOR) 40 MG tablet Take 1 tablet by mouth Every Night.     • traMADol (ULTRAM) 50 MG tablet Take 1 tablet by mouth 2 (Two) Times a Day As Needed.     • VITAMIN D PO Take 1 capsule by mouth Daily.        No current facility-administered medications for this visit.     Current Outpatient Medications on File Prior to Visit   Medication Sig   • amLODIPine (NORVASC) 10 MG tablet Take 1 tablet by mouth Every Morning.   • atenolol (TENORMIN) 50 MG tablet Take 1 tablet by mouth Every Morning.   • calcium carbonate (OS-VIRA) 600 MG tablet Take 1 tablet by mouth Daily.   • DULoxetine (CYMBALTA) 30 MG capsule TAKE TWO CAPSULES BY MOUTH DAILY (Patient taking differently: Take 2 capsules by mouth Every Morning.)   • famotidine (PEPCID) 20 MG tablet Take 1 tablet by mouth As Needed for Heartburn.   • fesoterodine fumarate (TOVIAZ ER) 4 MG tablet sustained-release 24 hour capsule Take 1 tablet by mouth Daily As Needed.   • HYDROcodone-acetaminophen (NORCO) 5-325 MG per tablet Take 1 tablet by mouth Every 6 (Six) Hours As Needed.   • irbesartan (AVAPRO) 300 MG tablet Take 1 tablet by mouth Every Morning.   • Januvia 100 MG tablet TAKE 1 TABLET DAILY (Patient taking differently: Take 1 tablet by mouth Every Morning.)   • Lantus SoloStar 100 UNIT/ML injection pen INJECT 25 UNITS UNDER THE SKIN INTO THE APPROPRIATE AREA AS DIRECTED EVERY NIGHT   • levothyroxine (SYNTHROID, LEVOTHROID) 50 MCG tablet Take 1 tablet by mouth Every Morning.   • metFORMIN (GLUCOPHAGE) 500 MG tablet Take 1 tablet by mouth Daily With Breakfast.   • pregabalin (LYRICA) 75 MG capsule Take 1 capsule by mouth 2 (Two) Times a Day.   • rosuvastatin (CRESTOR) 40 MG tablet Take 1 tablet by mouth Every Night.   • traMADol (ULTRAM) 50 MG tablet Take 1 tablet by mouth 2 (Two) Times a Day As Needed.   • VITAMIN D PO Take 1 capsule by mouth Daily.     No current facility-administered medications on file prior to visit.     She is allergic to sulfa antibiotics and penicillins..    Review of Systems   Constitutional: Positive for activity change. Negative for chills and fever.   Musculoskeletal: Positive for back pain.   Neurological: Negative for weakness and numbness.  "  Psychiatric/Behavioral: Negative for sleep disturbance.       Objective    Vitals:    10/27/22 1243   BP: 128/90   Pulse: 105   Temp: 96.8 °F (36 °C)   SpO2: 97%     Body mass index is 29.63 kg/m².    Physical Exam  Vitals and nursing note reviewed.   Constitutional:       General: She is not in acute distress.     Appearance: Normal appearance.   HENT:      Head: Normocephalic.      Nose: Nose normal.      Mouth/Throat:      Mouth: Mucous membranes are moist.   Eyes:      Extraocular Movements: Extraocular movements intact.      Conjunctiva/sclera: Conjunctivae normal.      Pupils: Pupils are equal, round, and reactive to light.   Cardiovascular:      Rate and Rhythm: Normal rate.   Pulmonary:      Effort: Pulmonary effort is normal.   Musculoskeletal:      Cervical back: Normal range of motion.      Lumbar back: No tenderness or bony tenderness.        Back:    Skin:     General: Skin is warm and dry.   Neurological:      General: No focal deficit present.      Mental Status: She is alert and oriented to person, place, and time.      Cranial Nerves: No cranial nerve deficit.      Sensory: No sensory deficit.      Motor: No weakness.      Coordination: Coordination normal.   Psychiatric:         Mood and Affect: Mood normal.         Behavior: Behavior normal.         Thought Content: Thought content normal.         Judgment: Judgment normal.       Neurologic Exam     Mental Status   Oriented to person, place, and time.     Cranial Nerves     CN III, IV, VI   Pupils are equal, round, and reactive to light.      Assessment & Plan   Independent Review of Radiographic Studies:    The L1 kyphoplasty procedure performed on 10/19/2022 was reviewed with the patient and shows bilateral balloon inflation with successful \"cement\" deposition.  No significant extravasation is seen.  Medical Decision Makin-year-old female with underlying osteoporosis and an L1 vertebral compression fracture now status post successful " balloon kyphoplasty with resolution of the severe pain.  Patient's mobility has improved and she wishes to have some physical therapy to continue strengthening and improving her core.  Her additional cerebrovascular risk factors are being medically managed and the patient was advised to discuss bone density treatment options with her primary care physician.  No further follow-up is required unless a new fracture occurs.  Diagnoses and all orders for this visit:    1. Compression fracture of L1 vertebra status post balloon kyphoplasty (Primary)  -     PT Consult: Eval & Treat; Future    2. Benign essential hypertension    3. Mixed hyperlipidemia      Return if symptoms worsen or fail to improve.

## 2022-10-19 ENCOUNTER — APPOINTMENT (OUTPATIENT)
Dept: GENERAL RADIOLOGY | Facility: HOSPITAL | Age: 87
End: 2022-10-19

## 2022-10-19 ENCOUNTER — ANESTHESIA (OUTPATIENT)
Dept: PERIOP | Facility: HOSPITAL | Age: 87
End: 2022-10-19

## 2022-10-19 ENCOUNTER — ANESTHESIA EVENT (OUTPATIENT)
Dept: PERIOP | Facility: HOSPITAL | Age: 87
End: 2022-10-19

## 2022-10-19 ENCOUNTER — HOSPITAL ENCOUNTER (OUTPATIENT)
Facility: HOSPITAL | Age: 87
Setting detail: HOSPITAL OUTPATIENT SURGERY
Discharge: HOME | End: 2022-10-19
Attending: RADIOLOGY | Admitting: RADIOLOGY

## 2022-10-19 VITALS
SYSTOLIC BLOOD PRESSURE: 153 MMHG | DIASTOLIC BLOOD PRESSURE: 73 MMHG | BODY MASS INDEX: 29.65 KG/M2 | HEART RATE: 60 BPM | TEMPERATURE: 98.4 F | RESPIRATION RATE: 16 BRPM | OXYGEN SATURATION: 96 % | HEIGHT: 62 IN

## 2022-10-19 DIAGNOSIS — S32.010A COMPRESSION FRACTURE OF L1 VERTEBRA, INITIAL ENCOUNTER: ICD-10-CM

## 2022-10-19 LAB
GLUCOSE BLDC GLUCOMTR-MCNC: 111 MG/DL (ref 70–130)
GLUCOSE BLDC GLUCOMTR-MCNC: 92 MG/DL (ref 70–130)

## 2022-10-19 PROCEDURE — 25010000002 FENTANYL CITRATE (PF) 50 MCG/ML SOLUTION: Performed by: NURSE ANESTHETIST, CERTIFIED REGISTERED

## 2022-10-19 PROCEDURE — 88311 DECALCIFY TISSUE: CPT | Performed by: RADIOLOGY

## 2022-10-19 PROCEDURE — 82962 GLUCOSE BLOOD TEST: CPT

## 2022-10-19 PROCEDURE — 88307 TISSUE EXAM BY PATHOLOGIST: CPT | Performed by: RADIOLOGY

## 2022-10-19 PROCEDURE — 25010000002 ONDANSETRON PER 1 MG: Performed by: NURSE ANESTHETIST, CERTIFIED REGISTERED

## 2022-10-19 PROCEDURE — 88342 IMHCHEM/IMCYTCHM 1ST ANTB: CPT | Performed by: RADIOLOGY

## 2022-10-19 PROCEDURE — 25010000002 PROPOFOL 200 MG/20ML EMULSION: Performed by: NURSE ANESTHETIST, CERTIFIED REGISTERED

## 2022-10-19 PROCEDURE — 25010000002 FENTANYL CITRATE (PF) 100 MCG/2ML SOLUTION: Performed by: NURSE ANESTHETIST, CERTIFIED REGISTERED

## 2022-10-19 PROCEDURE — 25010000002 HYDROMORPHONE PER 4 MG: Performed by: NURSE ANESTHETIST, CERTIFIED REGISTERED

## 2022-10-19 PROCEDURE — 25010000002 MIDAZOLAM PER 1 MG: Performed by: ANESTHESIOLOGY

## 2022-10-19 PROCEDURE — C1713 ANCHOR/SCREW BN/BN,TIS/BN: HCPCS | Performed by: RADIOLOGY

## 2022-10-19 PROCEDURE — 0 IOPAMIDOL 41 % SOLUTION: Performed by: RADIOLOGY

## 2022-10-19 PROCEDURE — 0 LIDOCAINE 1 % SOLUTION: Performed by: RADIOLOGY

## 2022-10-19 PROCEDURE — 25010000002 CEFAZOLIN IN DEXTROSE 2-4 GM/100ML-% SOLUTION: Performed by: RADIOLOGY

## 2022-10-19 PROCEDURE — 22514 PERQ VERTEBRAL AUGMENTATION: CPT | Performed by: RADIOLOGY

## 2022-10-19 DEVICE — BONE CEMENT C01B HV-R WITH MIXER  US
Type: IMPLANTABLE DEVICE | Site: SPINE LUMBAR | Status: FUNCTIONAL
Brand: KYPHON® HV-R® BONE CEMENT AND KYPHON® MIXER PACK

## 2022-10-19 DEVICE — CEMENT C01A KYPHX HV-R BONE CEMENT EN
Type: IMPLANTABLE DEVICE | Site: SPINE LUMBAR | Status: FUNCTIONAL
Brand: KYPHON® HV-R® BONE CEMENT

## 2022-10-19 RX ORDER — FENTANYL CITRATE 50 UG/ML
50 INJECTION, SOLUTION INTRAMUSCULAR; INTRAVENOUS
Status: DISCONTINUED | OUTPATIENT
Start: 2022-10-19 | End: 2022-10-19 | Stop reason: HOSPADM

## 2022-10-19 RX ORDER — FAMOTIDINE 10 MG/ML
20 INJECTION, SOLUTION INTRAVENOUS ONCE
Status: COMPLETED | OUTPATIENT
Start: 2022-10-19 | End: 2022-10-19

## 2022-10-19 RX ORDER — PROMETHAZINE HYDROCHLORIDE 25 MG/1
25 TABLET ORAL ONCE AS NEEDED
Status: DISCONTINUED | OUTPATIENT
Start: 2022-10-19 | End: 2022-10-19 | Stop reason: HOSPADM

## 2022-10-19 RX ORDER — ONDANSETRON 2 MG/ML
4 INJECTION INTRAMUSCULAR; INTRAVENOUS ONCE AS NEEDED
Status: COMPLETED | OUTPATIENT
Start: 2022-10-19 | End: 2022-10-19

## 2022-10-19 RX ORDER — ACETAMINOPHEN 325 MG/1
650 TABLET ORAL EVERY 4 HOURS PRN
Status: CANCELLED | OUTPATIENT
Start: 2022-10-19

## 2022-10-19 RX ORDER — NALOXONE HCL 0.4 MG/ML
0.2 VIAL (ML) INJECTION AS NEEDED
Status: DISCONTINUED | OUTPATIENT
Start: 2022-10-19 | End: 2022-10-19 | Stop reason: HOSPADM

## 2022-10-19 RX ORDER — SODIUM CHLORIDE 0.9 % (FLUSH) 0.9 %
3-10 SYRINGE (ML) INJECTION AS NEEDED
Status: DISCONTINUED | OUTPATIENT
Start: 2022-10-19 | End: 2022-10-19 | Stop reason: HOSPADM

## 2022-10-19 RX ORDER — LIDOCAINE HYDROCHLORIDE 10 MG/ML
INJECTION, SOLUTION INFILTRATION; PERINEURAL AS NEEDED
Status: DISCONTINUED | OUTPATIENT
Start: 2022-10-19 | End: 2022-10-19 | Stop reason: HOSPADM

## 2022-10-19 RX ORDER — FLUMAZENIL 0.1 MG/ML
0.2 INJECTION INTRAVENOUS AS NEEDED
Status: DISCONTINUED | OUTPATIENT
Start: 2022-10-19 | End: 2022-10-19 | Stop reason: HOSPADM

## 2022-10-19 RX ORDER — EPHEDRINE SULFATE 50 MG/ML
5 INJECTION, SOLUTION INTRAVENOUS ONCE AS NEEDED
Status: DISCONTINUED | OUTPATIENT
Start: 2022-10-19 | End: 2022-10-19 | Stop reason: HOSPADM

## 2022-10-19 RX ORDER — HYDROMORPHONE HYDROCHLORIDE 1 MG/ML
0.5 INJECTION, SOLUTION INTRAMUSCULAR; INTRAVENOUS; SUBCUTANEOUS
Status: DISCONTINUED | OUTPATIENT
Start: 2022-10-19 | End: 2022-10-19 | Stop reason: HOSPADM

## 2022-10-19 RX ORDER — SODIUM CHLORIDE 0.9 % (FLUSH) 0.9 %
3 SYRINGE (ML) INJECTION EVERY 12 HOURS SCHEDULED
Status: DISCONTINUED | OUTPATIENT
Start: 2022-10-19 | End: 2022-10-19 | Stop reason: HOSPADM

## 2022-10-19 RX ORDER — ATENOLOL 25 MG/1
50 TABLET ORAL
Status: DISCONTINUED | OUTPATIENT
Start: 2022-10-19 | End: 2022-10-19 | Stop reason: HOSPADM

## 2022-10-19 RX ORDER — SODIUM CHLORIDE 0.9 % (FLUSH) 0.9 %
10 SYRINGE (ML) INJECTION AS NEEDED
Status: CANCELLED | OUTPATIENT
Start: 2022-10-19

## 2022-10-19 RX ORDER — MIDAZOLAM HYDROCHLORIDE 1 MG/ML
0.5 INJECTION INTRAMUSCULAR; INTRAVENOUS
Status: DISCONTINUED | OUTPATIENT
Start: 2022-10-19 | End: 2022-10-19 | Stop reason: HOSPADM

## 2022-10-19 RX ORDER — PROMETHAZINE HYDROCHLORIDE 25 MG/1
25 SUPPOSITORY RECTAL ONCE AS NEEDED
Status: DISCONTINUED | OUTPATIENT
Start: 2022-10-19 | End: 2022-10-19 | Stop reason: HOSPADM

## 2022-10-19 RX ORDER — LIDOCAINE HYDROCHLORIDE 20 MG/ML
INJECTION, SOLUTION EPIDURAL; INFILTRATION; INTRACAUDAL; PERINEURAL AS NEEDED
Status: DISCONTINUED | OUTPATIENT
Start: 2022-10-19 | End: 2022-10-19 | Stop reason: SURG

## 2022-10-19 RX ORDER — OXYCODONE AND ACETAMINOPHEN 7.5; 325 MG/1; MG/1
1 TABLET ORAL EVERY 4 HOURS PRN
Status: DISCONTINUED | OUTPATIENT
Start: 2022-10-19 | End: 2022-10-19 | Stop reason: HOSPADM

## 2022-10-19 RX ORDER — IBUPROFEN 600 MG/1
600 TABLET ORAL ONCE AS NEEDED
Status: DISCONTINUED | OUTPATIENT
Start: 2022-10-19 | End: 2022-10-19 | Stop reason: HOSPADM

## 2022-10-19 RX ORDER — HYDROCODONE BITARTRATE AND ACETAMINOPHEN 7.5; 325 MG/1; MG/1
1 TABLET ORAL ONCE AS NEEDED
Status: COMPLETED | OUTPATIENT
Start: 2022-10-19 | End: 2022-10-19

## 2022-10-19 RX ORDER — ONDANSETRON 2 MG/ML
INJECTION INTRAMUSCULAR; INTRAVENOUS AS NEEDED
Status: DISCONTINUED | OUTPATIENT
Start: 2022-10-19 | End: 2022-10-19 | Stop reason: SURG

## 2022-10-19 RX ORDER — CEFAZOLIN SODIUM 2 G/100ML
2 INJECTION, SOLUTION INTRAVENOUS ONCE
Status: COMPLETED | OUTPATIENT
Start: 2022-10-19 | End: 2022-10-19

## 2022-10-19 RX ORDER — LABETALOL HYDROCHLORIDE 5 MG/ML
5 INJECTION, SOLUTION INTRAVENOUS
Status: DISCONTINUED | OUTPATIENT
Start: 2022-10-19 | End: 2022-10-19 | Stop reason: HOSPADM

## 2022-10-19 RX ORDER — ROCURONIUM BROMIDE 10 MG/ML
INJECTION, SOLUTION INTRAVENOUS AS NEEDED
Status: DISCONTINUED | OUTPATIENT
Start: 2022-10-19 | End: 2022-10-19 | Stop reason: SURG

## 2022-10-19 RX ORDER — LIDOCAINE HYDROCHLORIDE 10 MG/ML
0.5 INJECTION, SOLUTION EPIDURAL; INFILTRATION; INTRACAUDAL; PERINEURAL ONCE AS NEEDED
Status: DISCONTINUED | OUTPATIENT
Start: 2022-10-19 | End: 2022-10-19 | Stop reason: HOSPADM

## 2022-10-19 RX ORDER — FENTANYL CITRATE 50 UG/ML
INJECTION, SOLUTION INTRAMUSCULAR; INTRAVENOUS AS NEEDED
Status: DISCONTINUED | OUTPATIENT
Start: 2022-10-19 | End: 2022-10-19 | Stop reason: SURG

## 2022-10-19 RX ORDER — HYDRALAZINE HYDROCHLORIDE 20 MG/ML
5 INJECTION INTRAMUSCULAR; INTRAVENOUS
Status: DISCONTINUED | OUTPATIENT
Start: 2022-10-19 | End: 2022-10-19 | Stop reason: HOSPADM

## 2022-10-19 RX ORDER — ACETAMINOPHEN 160 MG/5ML
650 SOLUTION ORAL EVERY 4 HOURS PRN
Status: CANCELLED | OUTPATIENT
Start: 2022-10-19

## 2022-10-19 RX ORDER — DIPHENHYDRAMINE HYDROCHLORIDE 50 MG/ML
12.5 INJECTION INTRAMUSCULAR; INTRAVENOUS
Status: DISCONTINUED | OUTPATIENT
Start: 2022-10-19 | End: 2022-10-19 | Stop reason: HOSPADM

## 2022-10-19 RX ORDER — SODIUM CHLORIDE 0.9 % (FLUSH) 0.9 %
10 SYRINGE (ML) INJECTION EVERY 12 HOURS SCHEDULED
Status: CANCELLED | OUTPATIENT
Start: 2022-10-19

## 2022-10-19 RX ORDER — DIPHENHYDRAMINE HCL 25 MG
25 CAPSULE ORAL
Status: DISCONTINUED | OUTPATIENT
Start: 2022-10-19 | End: 2022-10-19 | Stop reason: HOSPADM

## 2022-10-19 RX ORDER — SODIUM CHLORIDE, SODIUM LACTATE, POTASSIUM CHLORIDE, CALCIUM CHLORIDE 600; 310; 30; 20 MG/100ML; MG/100ML; MG/100ML; MG/100ML
9 INJECTION, SOLUTION INTRAVENOUS CONTINUOUS
Status: DISCONTINUED | OUTPATIENT
Start: 2022-10-19 | End: 2022-10-19 | Stop reason: HOSPADM

## 2022-10-19 RX ORDER — PROPOFOL 10 MG/ML
INJECTION, EMULSION INTRAVENOUS AS NEEDED
Status: DISCONTINUED | OUTPATIENT
Start: 2022-10-19 | End: 2022-10-19 | Stop reason: SURG

## 2022-10-19 RX ADMIN — ROCURONIUM BROMIDE 30 MG: 50 INJECTION INTRAVENOUS at 09:59

## 2022-10-19 RX ADMIN — ONDANSETRON 4 MG: 2 INJECTION INTRAMUSCULAR; INTRAVENOUS at 10:37

## 2022-10-19 RX ADMIN — PROPOFOL 80 MG: 10 INJECTION, EMULSION INTRAVENOUS at 09:58

## 2022-10-19 RX ADMIN — HYDROMORPHONE HYDROCHLORIDE 0.5 MG: 1 INJECTION, SOLUTION INTRAMUSCULAR; INTRAVENOUS; SUBCUTANEOUS at 10:59

## 2022-10-19 RX ADMIN — FENTANYL CITRATE 25 MCG: 50 INJECTION, SOLUTION INTRAMUSCULAR; INTRAVENOUS at 10:22

## 2022-10-19 RX ADMIN — FENTANYL CITRATE 25 MCG: 50 INJECTION, SOLUTION INTRAMUSCULAR; INTRAVENOUS at 10:47

## 2022-10-19 RX ADMIN — LIDOCAINE HYDROCHLORIDE 80 MG: 20 INJECTION, SOLUTION EPIDURAL; INFILTRATION; INTRACAUDAL; PERINEURAL at 09:58

## 2022-10-19 RX ADMIN — MIDAZOLAM 0.5 MG: 1 INJECTION, SOLUTION INTRAMUSCULAR; INTRAVENOUS at 09:27

## 2022-10-19 RX ADMIN — ONDANSETRON 4 MG: 2 INJECTION INTRAMUSCULAR; INTRAVENOUS at 11:18

## 2022-10-19 RX ADMIN — FENTANYL CITRATE 50 MCG: 50 INJECTION INTRAMUSCULAR; INTRAVENOUS at 10:54

## 2022-10-19 RX ADMIN — SODIUM CHLORIDE, POTASSIUM CHLORIDE, SODIUM LACTATE AND CALCIUM CHLORIDE 9 ML/HR: 600; 310; 30; 20 INJECTION, SOLUTION INTRAVENOUS at 09:26

## 2022-10-19 RX ADMIN — HYDROCODONE BITARTRATE AND ACETAMINOPHEN 1 TABLET: 7.5; 325 TABLET ORAL at 11:16

## 2022-10-19 RX ADMIN — SUGAMMADEX 200 MG: 100 INJECTION, SOLUTION INTRAVENOUS at 10:37

## 2022-10-19 RX ADMIN — CEFAZOLIN SODIUM 2 G: 2 INJECTION, SOLUTION INTRAVENOUS at 09:46

## 2022-10-19 RX ADMIN — FAMOTIDINE 20 MG: 10 INJECTION INTRAVENOUS at 09:27

## 2022-10-19 RX ADMIN — FENTANYL CITRATE 50 MCG: 50 INJECTION, SOLUTION INTRAMUSCULAR; INTRAVENOUS at 09:58

## 2022-10-19 RX ADMIN — ATENOLOL 50 MG: 25 TABLET ORAL at 09:24

## 2022-10-19 NOTE — BRIEF OP NOTE
"KYPHOPLASTY 1-2 LEVELS  Progress Note    Ashli Eaton  10/19/2022    Pre-op Diagnosis:   Compression fracture of L1 vertebra, initial encounter ([facility]) [S32.010A]       Post-Op Diagnosis Codes:     * Compression fracture of L1 vertebra, initial encounter ([facility]) [S32.010A]    Procedure/CPT® Codes:        Procedure(s):  KYPHOPLASTY Lumbar One        Surgeon(s):  Pancho Hill MD    Anesthesia: General    Staff:   Circulator: Vibha Menjivar RN  Scrub Person: Manan Simmons Dariane  Vendor Representative: Shivani Ly  Vascular Radiology Technician: Suze Enriquez         Estimated Blood Loss: minimal    Urine Voided: * No values recorded between 10/19/2022  9:50 AM and 10/19/2022 10:37 AM *    Specimens:                Specimens     ID Source Type Tests Collected By Collected At Frozen?    A Spine, Lumbar Bone · TISSUE PATHOLOGY EXAM   Pancho Hill MD 10/19/22 1024 No    Description: LUMBAR ONE BIOPSY    This specimen was not marked as sent.                Drains: * No LDAs found *    Findings: Bilateral balloon kyphoplasty at L1 with good \"cement\" deposition. No significant extravasation seen. Official report to follow.        Complications: None encountered.          Pancho Hill MD     Date: 10/19/2022  Time: 10:41 EDT      "

## 2022-10-19 NOTE — ANESTHESIA POSTPROCEDURE EVALUATION
Patient: Ashli Eaton    Procedure Summary     Date: 10/19/22 Room / Location: [facility] OR 19 INV / [facility] HYBRID OR 18/19    Anesthesia Start: 0950 Anesthesia Stop: 1054    Procedure: KYPHOPLASTY Lumbar One (Spine Lumbar) Diagnosis:       Compression fracture of L1 vertebra, initial encounter ([facility])      (Compression fracture of L1 vertebra, initial encounter ([facility]) [S32.010A])    Surgeons: Pancho Hill MD Provider: Jamaal Maloney MD    Anesthesia Type: general ASA Status: 3          Anesthesia Type: general    Vitals  Vitals Value Taken Time   /86 10/19/22 1201   Temp 36.9 °C (98.4 °F) 10/19/22 1200   Pulse 66 10/19/22 1209   Resp 14 10/19/22 1200   SpO2 99 % 10/19/22 1209   Vitals shown include unvalidated device data.        Post Anesthesia Care and Evaluation    Patient location during evaluation: PACU  Patient participation: complete - patient participated  Level of consciousness: awake and alert  Pain management: adequate    Airway patency: patent  Anesthetic complications: No anesthetic complications    Cardiovascular status: acceptable  Respiratory status: acceptable  Hydration status: acceptable    Comments: --------------------            10/19/22               1300     --------------------   BP:       153/73     Pulse:      60       Resp:       16       Temp:                SpO2:      96%      --------------------

## 2022-10-19 NOTE — ANESTHESIA PROCEDURE NOTES
Airway  Urgency: elective    Date/Time: 10/19/2022 10:02 AM  Airway not difficult    General Information and Staff    Patient location during procedure: OR  CRNA/CAA: Stephany Pierce CRNA    Indications and Patient Condition    Preoxygenated: yes  Mask difficulty assessment: 1 - vent by mask    Final Airway Details  Final airway type: endotracheal airway      Successful airway: ETT  Cuffed: yes   Successful intubation technique: direct laryngoscopy  Endotracheal tube insertion site: oral  Blade: Beena  Blade size: 3  ETT size (mm): 7.0  Cormack-Lehane Classification: grade I - full view of glottis  Placement verified by: chest auscultation and capnometry   Measured from: teeth  ETT/EBT  to teeth (cm): 21  Number of attempts at approach: 1  Assessment: lips, teeth, and gum same as pre-op and atraumatic intubation    Additional Comments  Teeth, tongue, lips, and gums in preop condition. VSS throughout. Easy mask/smooth easy airway. Tube visualized through cords.

## 2022-10-19 NOTE — ANESTHESIA PREPROCEDURE EVALUATION
Anesthesia Evaluation     history of anesthetic complications:  NPO Solid Status: > 8 hours  NPO Liquid Status: > 2 hours           Airway   Mallampati: II  TM distance: >3 FB  Neck ROM: full  Dental - normal exam     Pulmonary - normal exam   Cardiovascular - normal exam    (+) hypertension, hyperlipidemia,       Neuro/Psych  GI/Hepatic/Renal/Endo    (+)  GERD,  renal disease CRI, diabetes mellitus type 2, thyroid problem     Musculoskeletal     (+) back pain,   Abdominal    Substance History      OB/GYN          Other                        Anesthesia Plan    ASA 3     general     intravenous induction     Anesthetic plan, risks, benefits, and alternatives have been provided, discussed and informed consent has been obtained with: patient and child (Daughters).    Plan discussed with CRNA.        CODE STATUS:

## 2022-10-20 LAB
LAB AP CASE REPORT: NORMAL
LAB AP SPECIAL STAINS: NORMAL
PATH REPORT.FINAL DX SPEC: NORMAL
PATH REPORT.GROSS SPEC: NORMAL

## 2022-10-27 ENCOUNTER — OFFICE VISIT (OUTPATIENT)
Dept: NEUROSURGERY | Facility: CLINIC | Age: 87
End: 2022-10-27

## 2022-10-27 VITALS
HEIGHT: 62 IN | WEIGHT: 162 LBS | TEMPERATURE: 96.8 F | HEART RATE: 105 BPM | DIASTOLIC BLOOD PRESSURE: 90 MMHG | BODY MASS INDEX: 29.81 KG/M2 | OXYGEN SATURATION: 97 % | SYSTOLIC BLOOD PRESSURE: 128 MMHG

## 2022-10-27 DIAGNOSIS — I10 BENIGN ESSENTIAL HYPERTENSION: ICD-10-CM

## 2022-10-27 DIAGNOSIS — E78.2 MIXED HYPERLIPIDEMIA: ICD-10-CM

## 2022-10-27 DIAGNOSIS — S32.010G COMPRESSION FRACTURE OF L1 VERTEBRA WITH DELAYED HEALING, SUBSEQUENT ENCOUNTER: Primary | ICD-10-CM

## 2022-10-27 PROCEDURE — 99214 OFFICE O/P EST MOD 30 MIN: CPT | Performed by: RADIOLOGY

## 2022-12-14 ENCOUNTER — TELEPHONE (OUTPATIENT)
Dept: NEUROSURGERY | Facility: CLINIC | Age: 87
End: 2022-12-14

## 2022-12-14 DIAGNOSIS — M54.50 ACUTE LOW BACK PAIN, UNSPECIFIED BACK PAIN LATERALITY, UNSPECIFIED WHETHER SCIATICA PRESENT: Primary | ICD-10-CM

## 2022-12-14 NOTE — TELEPHONE ENCOUNTER
"  Caller: Ashli Whitaker    Relationship: Self    Best call back number: [phone]    What is the best time to reach you: ANY TIME    Who are you requesting to speak with (clinical staff, provider,  specific staff member): CLINICAL    Do you know the name of the person who called: ASHLI WHITAKER    What was the call regarding: IN PAIN - POSSIBLE APPOINTMENT     Do you require a callback: YES    PATIENT CALLED IN TO LET US KNOW SHE IS IN  A LOT OF PAIN. HER ORTHOPEDIC DR. TOLD HER TO GIVE US A CALL -  HAVING BACK PAIN, CANNOT STAND OR WALK MUCH. PAIN LEVEL 7/10 -  PLEASE CALL PATIENT WITH ADVICE - PER LAST OFFICE VISIT NOTE: \"No further follow-up is required unless a new fracture occurs.\"     THANK YOU!    "

## 2023-07-05 ENCOUNTER — OFFICE (AMBULATORY)
Dept: URBAN - METROPOLITAN AREA CLINIC 76 | Facility: CLINIC | Age: 88
End: 2023-07-05

## 2023-07-05 VITALS
DIASTOLIC BLOOD PRESSURE: 63 MMHG | HEIGHT: 62 IN | HEART RATE: 70 BPM | SYSTOLIC BLOOD PRESSURE: 107 MMHG | OXYGEN SATURATION: 97 % | WEIGHT: 158 LBS

## 2023-07-05 DIAGNOSIS — R13.10 DYSPHAGIA, UNSPECIFIED: ICD-10-CM

## 2023-07-05 DIAGNOSIS — K21.9 GASTRO-ESOPHAGEAL REFLUX DISEASE WITHOUT ESOPHAGITIS: ICD-10-CM

## 2023-07-05 PROCEDURE — 99204 OFFICE O/P NEW MOD 45 MIN: CPT | Performed by: INTERNAL MEDICINE

## 2023-07-05 RX ORDER — FAMOTIDINE 20 MG/1
40 TABLET, FILM COATED ORAL
Qty: 60 | Refills: 6 | Status: ACTIVE
Start: 2023-07-05

## 2023-10-24 ENCOUNTER — TELEPHONE (OUTPATIENT)
Dept: NEUROSURGERY | Facility: CLINIC | Age: 88
End: 2023-10-24
Payer: MEDICARE

## 2023-10-24 DIAGNOSIS — S32.010G COMPRESSION FRACTURE OF L1 VERTEBRA WITH DELAYED HEALING, SUBSEQUENT ENCOUNTER: Primary | ICD-10-CM

## 2023-10-24 NOTE — TELEPHONE ENCOUNTER
"PATIENT CALLED IN AND IS REQUESTING AN APPOINTMENT WITH DR. BISHOP.     PATIENT STATES SHE HAS A LOT OF PAIN THAT COMES AND GOES, HAS BEEN GOING ON FOR ABOUT 6 MONTHS.  NO NEW FRACTURE THAT SHE IS AWARE OF.     PER LAST OVN:  \"Her additional cerebrovascular risk factors are being medically managed and the patient was advised to discuss bone density treatment options with her primary care physician.  No further follow-up is required unless a new fracture occurs.\"    PLEASE CALL PATIENT WITH ANY ADVICE.     THANK YOU!  "

## 2023-10-25 NOTE — TELEPHONE ENCOUNTER
PATIENT CALLED BACK AND STATES HER PAIN IS JUST SHARP AND IT JUST HURTS HER....     WHEN SITTING, WHEN BENDING DOWN, LYING DOWN - JUST ABOUT ALL DAY     PLEASE CALL PATIENT BACK IF YOU HAVE OTHER QUESTIONS.    THANK YOU!

## 2023-10-25 NOTE — TELEPHONE ENCOUNTER
Called patient and LVM for her to call me back regarding her pain. I needed to know more specifics about what kind of pain she is having because Dr. Hill would want imaging prior to being seen.

## 2023-10-26 NOTE — TELEPHONE ENCOUNTER
Spoke with patient and advised that Dr. Hill would like to have an MRI first. Let her know that has been ordered and pt voiced understanding.

## 2023-11-13 ENCOUNTER — HOSPITAL ENCOUNTER (OUTPATIENT)
Dept: MRI IMAGING | Facility: HOSPITAL | Age: 88
Discharge: HOME | End: 2023-11-13
Admitting: RADIOLOGY
Payer: MEDICARE

## 2023-11-13 DIAGNOSIS — S32.010G COMPRESSION FRACTURE OF L1 VERTEBRA WITH DELAYED HEALING, SUBSEQUENT ENCOUNTER: ICD-10-CM

## 2023-11-13 PROCEDURE — 72148 MRI LUMBAR SPINE W/O DYE: CPT

## 2024-01-03 ENCOUNTER — OFFICE (AMBULATORY)
Dept: URBAN - METROPOLITAN AREA CLINIC 76 | Facility: CLINIC | Age: 89
End: 2024-01-03

## 2024-01-03 VITALS
SYSTOLIC BLOOD PRESSURE: 148 MMHG | OXYGEN SATURATION: 98 % | HEIGHT: 62 IN | HEART RATE: 100 BPM | WEIGHT: 159 LBS | SYSTOLIC BLOOD PRESSURE: 169 MMHG | DIASTOLIC BLOOD PRESSURE: 93 MMHG

## 2024-01-03 DIAGNOSIS — K21.9 GASTRO-ESOPHAGEAL REFLUX DISEASE WITHOUT ESOPHAGITIS: ICD-10-CM

## 2024-01-03 DIAGNOSIS — R13.10 DYSPHAGIA, UNSPECIFIED: ICD-10-CM

## 2024-01-03 PROCEDURE — 99214 OFFICE O/P EST MOD 30 MIN: CPT | Performed by: INTERNAL MEDICINE

## 2024-01-09 VITALS
DIASTOLIC BLOOD PRESSURE: 75 MMHG | WEIGHT: 160 LBS | HEART RATE: 91 BPM | SYSTOLIC BLOOD PRESSURE: 188 MMHG | RESPIRATION RATE: 22 BRPM | TEMPERATURE: 98 F | OXYGEN SATURATION: 94 % | TEMPERATURE: 98.1 F | OXYGEN SATURATION: 97 % | DIASTOLIC BLOOD PRESSURE: 92 MMHG | SYSTOLIC BLOOD PRESSURE: 240 MMHG | HEART RATE: 78 BPM | DIASTOLIC BLOOD PRESSURE: 90 MMHG | SYSTOLIC BLOOD PRESSURE: 209 MMHG | HEART RATE: 92 BPM | OXYGEN SATURATION: 90 % | DIASTOLIC BLOOD PRESSURE: 79 MMHG | SYSTOLIC BLOOD PRESSURE: 170 MMHG | HEIGHT: 62 IN | DIASTOLIC BLOOD PRESSURE: 80 MMHG | SYSTOLIC BLOOD PRESSURE: 172 MMHG | DIASTOLIC BLOOD PRESSURE: 108 MMHG | RESPIRATION RATE: 12 BRPM | HEART RATE: 83 BPM | RESPIRATION RATE: 15 BRPM | HEART RATE: 76 BPM | DIASTOLIC BLOOD PRESSURE: 94 MMHG | SYSTOLIC BLOOD PRESSURE: 217 MMHG | HEART RATE: 87 BPM | RESPIRATION RATE: 13 BRPM | OXYGEN SATURATION: 99 % | SYSTOLIC BLOOD PRESSURE: 236 MMHG | HEART RATE: 86 BPM | HEART RATE: 88 BPM | RESPIRATION RATE: 14 BRPM | RESPIRATION RATE: 20 BRPM | SYSTOLIC BLOOD PRESSURE: 200 MMHG | DIASTOLIC BLOOD PRESSURE: 95 MMHG | HEART RATE: 69 BPM | OXYGEN SATURATION: 92 % | OXYGEN SATURATION: 96 % | RESPIRATION RATE: 16 BRPM | OXYGEN SATURATION: 95 % | OXYGEN SATURATION: 93 %

## 2024-01-11 ENCOUNTER — OFFICE (AMBULATORY)
Dept: URBAN - METROPOLITAN AREA PATHOLOGY 4 | Facility: PATHOLOGY | Age: 89
End: 2024-01-11

## 2024-01-11 ENCOUNTER — AMBULATORY SURGICAL CENTER (AMBULATORY)
Dept: URBAN - METROPOLITAN AREA SURGERY 17 | Facility: SURGERY | Age: 89
End: 2024-01-11

## 2024-01-11 DIAGNOSIS — K31.89 OTHER DISEASES OF STOMACH AND DUODENUM: ICD-10-CM

## 2024-01-11 DIAGNOSIS — K31.7 POLYP OF STOMACH AND DUODENUM: ICD-10-CM

## 2024-01-11 DIAGNOSIS — K21.9 GASTRO-ESOPHAGEAL REFLUX DISEASE WITHOUT ESOPHAGITIS: ICD-10-CM

## 2024-01-11 DIAGNOSIS — K44.9 DIAPHRAGMATIC HERNIA WITHOUT OBSTRUCTION OR GANGRENE: ICD-10-CM

## 2024-01-11 DIAGNOSIS — R13.10 DYSPHAGIA, UNSPECIFIED: ICD-10-CM

## 2024-01-11 LAB
GI HISTOLOGY: A. UNSPECIFIED: (no result)
GI HISTOLOGY: B. UNSPECIFIED: (no result)
GI HISTOLOGY: C. SELECT: (no result)
GI HISTOLOGY: D. UNSPECIFIED: (no result)
GI HISTOLOGY: E. UNSPECIFIED: (no result)
GI HISTOLOGY: PDF REPORT: (no result)

## 2024-01-11 PROCEDURE — 88305 TISSUE EXAM BY PATHOLOGIST: CPT | Performed by: INTERNAL MEDICINE

## 2024-01-11 PROCEDURE — 88342 IMHCHEM/IMCYTCHM 1ST ANTB: CPT | Performed by: INTERNAL MEDICINE

## 2024-01-11 PROCEDURE — 43239 EGD BIOPSY SINGLE/MULTIPLE: CPT | Performed by: INTERNAL MEDICINE

## 2024-01-31 ENCOUNTER — OFFICE (AMBULATORY)
Dept: URBAN - METROPOLITAN AREA CLINIC 76 | Facility: CLINIC | Age: 89
End: 2024-01-31
Payer: MEDICARE

## 2024-01-31 DIAGNOSIS — K21.9 GASTRO-ESOPHAGEAL REFLUX DISEASE WITHOUT ESOPHAGITIS: ICD-10-CM

## 2024-01-31 PROCEDURE — 99426 PRIN CARE MGMT STAFF 1ST 30: CPT | Performed by: INTERNAL MEDICINE

## 2024-03-31 ENCOUNTER — OFFICE (AMBULATORY)
Dept: URBAN - METROPOLITAN AREA CLINIC 76 | Facility: CLINIC | Age: 89
End: 2024-03-31
Payer: MEDICARE

## 2024-03-31 DIAGNOSIS — K21.9 GASTRO-ESOPHAGEAL REFLUX DISEASE WITHOUT ESOPHAGITIS: ICD-10-CM

## 2024-03-31 PROCEDURE — 99426 PRIN CARE MGMT STAFF 1ST 30: CPT | Performed by: INTERNAL MEDICINE

## 2024-04-11 ENCOUNTER — TRANSCRIBE ORDERS (OUTPATIENT)
Dept: ADMINISTRATIVE | Facility: HOSPITAL | Age: 89
End: 2024-04-11
Payer: MEDICARE

## 2024-04-11 DIAGNOSIS — R06.02 SOB (SHORTNESS OF BREATH): Primary | ICD-10-CM

## 2024-04-29 ENCOUNTER — OFFICE (AMBULATORY)
Dept: URBAN - METROPOLITAN AREA CLINIC 76 | Facility: CLINIC | Age: 89
End: 2024-04-29

## 2024-04-29 VITALS
SYSTOLIC BLOOD PRESSURE: 158 MMHG | DIASTOLIC BLOOD PRESSURE: 80 MMHG | SYSTOLIC BLOOD PRESSURE: 151 MMHG | DIASTOLIC BLOOD PRESSURE: 84 MMHG | RESPIRATION RATE: 20 BRPM | HEIGHT: 62 IN | WEIGHT: 162 LBS | HEART RATE: 69 BPM

## 2024-04-29 DIAGNOSIS — K21.9 GASTRO-ESOPHAGEAL REFLUX DISEASE WITHOUT ESOPHAGITIS: ICD-10-CM

## 2024-04-29 DIAGNOSIS — R13.10 DYSPHAGIA, UNSPECIFIED: ICD-10-CM

## 2024-04-29 PROCEDURE — 99213 OFFICE O/P EST LOW 20 MIN: CPT | Performed by: INTERNAL MEDICINE

## 2024-04-30 ENCOUNTER — OFFICE (AMBULATORY)
Dept: URBAN - METROPOLITAN AREA CLINIC 76 | Facility: CLINIC | Age: 89
End: 2024-04-30
Payer: MEDICARE

## 2024-04-30 DIAGNOSIS — K21.9 GASTRO-ESOPHAGEAL REFLUX DISEASE WITHOUT ESOPHAGITIS: ICD-10-CM

## 2024-04-30 PROCEDURE — 99426 PRIN CARE MGMT STAFF 1ST 30: CPT | Performed by: INTERNAL MEDICINE

## 2024-07-31 ENCOUNTER — OFFICE (AMBULATORY)
Dept: URBAN - METROPOLITAN AREA CLINIC 76 | Facility: CLINIC | Age: 89
End: 2024-07-31
Payer: MEDICARE

## 2024-07-31 DIAGNOSIS — K21.9 GASTRO-ESOPHAGEAL REFLUX DISEASE WITHOUT ESOPHAGITIS: ICD-10-CM

## 2024-07-31 PROCEDURE — 99426 PRIN CARE MGMT STAFF 1ST 30: CPT | Performed by: INTERNAL MEDICINE

## 2024-08-31 ENCOUNTER — OFFICE (AMBULATORY)
Age: 89
End: 2024-08-31
Payer: MEDICARE

## 2024-08-31 ENCOUNTER — OFFICE (AMBULATORY)
Dept: URBAN - METROPOLITAN AREA CLINIC 76 | Facility: CLINIC | Age: 89
End: 2024-08-31
Payer: MEDICARE

## 2024-08-31 DIAGNOSIS — K21.9 GASTRO-ESOPHAGEAL REFLUX DISEASE WITHOUT ESOPHAGITIS: ICD-10-CM

## 2024-08-31 PROCEDURE — 99426 PRIN CARE MGMT STAFF 1ST 30: CPT | Performed by: INTERNAL MEDICINE

## 2024-09-30 ENCOUNTER — OFFICE (AMBULATORY)
Age: 89
End: 2024-09-30
Payer: MEDICARE

## 2024-09-30 ENCOUNTER — OFFICE (AMBULATORY)
Dept: URBAN - METROPOLITAN AREA CLINIC 76 | Facility: CLINIC | Age: 89
End: 2024-09-30
Payer: MEDICARE

## 2024-09-30 DIAGNOSIS — K21.9 GASTRO-ESOPHAGEAL REFLUX DISEASE WITHOUT ESOPHAGITIS: ICD-10-CM

## 2024-09-30 PROCEDURE — 99426 PRIN CARE MGMT STAFF 1ST 30: CPT | Performed by: INTERNAL MEDICINE

## 2024-10-31 ENCOUNTER — OFFICE (AMBULATORY)
Age: 89
End: 2024-10-31
Payer: MEDICARE

## 2024-10-31 ENCOUNTER — OFFICE (AMBULATORY)
Dept: URBAN - METROPOLITAN AREA CLINIC 76 | Facility: CLINIC | Age: 89
End: 2024-10-31
Payer: MEDICARE

## 2024-10-31 DIAGNOSIS — K21.9 GASTRO-ESOPHAGEAL REFLUX DISEASE WITHOUT ESOPHAGITIS: ICD-10-CM

## 2024-10-31 PROCEDURE — 99426 PRIN CARE MGMT STAFF 1ST 30: CPT | Performed by: INTERNAL MEDICINE

## 2024-11-30 ENCOUNTER — OFFICE (AMBULATORY)
Dept: URBAN - METROPOLITAN AREA CLINIC 76 | Facility: CLINIC | Age: 89
End: 2024-11-30
Payer: MEDICARE

## 2024-11-30 DIAGNOSIS — K21.9 GASTRO-ESOPHAGEAL REFLUX DISEASE WITHOUT ESOPHAGITIS: ICD-10-CM

## 2024-11-30 PROCEDURE — 99426 PRIN CARE MGMT STAFF 1ST 30: CPT | Performed by: INTERNAL MEDICINE

## 2025-02-28 ENCOUNTER — OFFICE (AMBULATORY)
Dept: URBAN - METROPOLITAN AREA CLINIC 76 | Facility: CLINIC | Age: OVER 89
End: 2025-02-28
Payer: MEDICARE

## 2025-02-28 DIAGNOSIS — K21.9 GASTRO-ESOPHAGEAL REFLUX DISEASE WITHOUT ESOPHAGITIS: ICD-10-CM

## 2025-02-28 PROCEDURE — 99426 PRIN CARE MGMT STAFF 1ST 30: CPT

## 2025-04-30 ENCOUNTER — OFFICE (AMBULATORY)
Dept: URBAN - METROPOLITAN AREA CLINIC 76 | Facility: CLINIC | Age: OVER 89
End: 2025-04-30
Payer: MEDICARE

## 2025-04-30 DIAGNOSIS — K21.9 GASTRO-ESOPHAGEAL REFLUX DISEASE WITHOUT ESOPHAGITIS: ICD-10-CM

## 2025-04-30 PROCEDURE — 99426 PRIN CARE MGMT STAFF 1ST 30: CPT

## 2025-05-31 ENCOUNTER — OFFICE (AMBULATORY)
Dept: URBAN - METROPOLITAN AREA CLINIC 76 | Facility: CLINIC | Age: OVER 89
End: 2025-05-31
Payer: MEDICARE

## 2025-05-31 DIAGNOSIS — K21.9 GASTRO-ESOPHAGEAL REFLUX DISEASE WITHOUT ESOPHAGITIS: ICD-10-CM

## 2025-05-31 PROCEDURE — 99426 PRIN CARE MGMT STAFF 1ST 30: CPT

## 2025-07-31 ENCOUNTER — OFFICE (AMBULATORY)
Dept: URBAN - METROPOLITAN AREA CLINIC 76 | Facility: CLINIC | Age: OVER 89
End: 2025-07-31
Payer: MEDICARE

## 2025-07-31 DIAGNOSIS — K21.9 GASTRO-ESOPHAGEAL REFLUX DISEASE WITHOUT ESOPHAGITIS: ICD-10-CM

## 2025-07-31 PROCEDURE — 99426 PRIN CARE MGMT STAFF 1ST 30: CPT

## (undated) DEVICE — CEMENT CARTRIDGES CC02A CDS: Brand: KYPHON® CEMENT DELIVERY SYSTEM

## (undated) DEVICE — TOWEL,OR,DSP,ST,BLUE,STD,4/PK,20PK/CS: Brand: MEDLINE

## (undated) DEVICE — NEEDLE, QUINCKE 22GX3.5": Brand: MEDLINE INDUSTRIES, INC.

## (undated) DEVICE — BONE BIOPSY DEVICE F07A TAPERED SIZE 2: Brand: MEDTRONIC REUSABLE INSTRUMENTS

## (undated) DEVICE — STRIP,CLOSURE,WOUND,MEDI-STRIP,1/2X4: Brand: MEDLINE

## (undated) DEVICE — CEMENT GUN AND BONE FILLER CDS3A SIZE 3: Brand: MEDTRONIC REUSABLE INSTRUMENTS

## (undated) DEVICE — IBT KIT KEX152EB-A FF E2 15/2 OI: Brand: KYPHON® EXPRESS™ OSTEO INTRODUCER® SYSTEM AND BFD

## (undated) DEVICE — 3M™ IOBAN™ 2 ANTIMICROBIAL INCISE DRAPE 6640EZ: Brand: IOBAN™ 2

## (undated) DEVICE — GLV XRAY RESIST ATTENUATING SZ8 BLK LF

## (undated) DEVICE — APPL CHLORAPREP HI/LITE 26ML ORNG

## (undated) DEVICE — PK KYPHOPLASTY 40